# Patient Record
Sex: FEMALE | Race: WHITE | NOT HISPANIC OR LATINO | Employment: UNEMPLOYED | URBAN - METROPOLITAN AREA
[De-identification: names, ages, dates, MRNs, and addresses within clinical notes are randomized per-mention and may not be internally consistent; named-entity substitution may affect disease eponyms.]

---

## 2017-01-19 RX ORDER — CLONAZEPAM 1 MG/1
1 TABLET ORAL 2 TIMES DAILY
Qty: 60 TABLET | Refills: 0 | Status: SHIPPED | OUTPATIENT
Start: 2017-01-19 | End: 2017-02-18 | Stop reason: SDUPTHER

## 2017-01-23 ENCOUNTER — OFFICE VISIT (OUTPATIENT)
Dept: FAMILY MEDICINE | Facility: CLINIC | Age: 42
End: 2017-01-23
Payer: COMMERCIAL

## 2017-01-23 VITALS
DIASTOLIC BLOOD PRESSURE: 74 MMHG | HEART RATE: 76 BPM | HEIGHT: 58 IN | SYSTOLIC BLOOD PRESSURE: 126 MMHG | BODY MASS INDEX: 35.26 KG/M2 | WEIGHT: 168 LBS

## 2017-01-23 DIAGNOSIS — G47.00 INSOMNIA, UNSPECIFIED TYPE: ICD-10-CM

## 2017-01-23 DIAGNOSIS — F41.9 ANXIETY: ICD-10-CM

## 2017-01-23 DIAGNOSIS — E03.9 HYPOTHYROIDISM, UNSPECIFIED TYPE: Primary | ICD-10-CM

## 2017-01-23 DIAGNOSIS — F32.A DEPRESSION, UNSPECIFIED DEPRESSION TYPE: ICD-10-CM

## 2017-01-23 PROCEDURE — 1159F MED LIST DOCD IN RCRD: CPT | Mod: S$GLB,,, | Performed by: FAMILY MEDICINE

## 2017-01-23 PROCEDURE — 99999 PR PBB SHADOW E&M-EST. PATIENT-LVL III: CPT | Mod: PBBFAC,,, | Performed by: FAMILY MEDICINE

## 2017-01-23 PROCEDURE — 99214 OFFICE O/P EST MOD 30 MIN: CPT | Mod: S$GLB,,, | Performed by: FAMILY MEDICINE

## 2017-01-23 RX ORDER — CITALOPRAM 40 MG/1
40 TABLET, FILM COATED ORAL NIGHTLY
Qty: 90 TABLET | Refills: 0 | Status: SHIPPED | OUTPATIENT
Start: 2017-01-23 | End: 2017-04-24 | Stop reason: SDUPTHER

## 2017-01-23 RX ORDER — PHENTERMINE HYDROCHLORIDE 37.5 MG/1
TABLET ORAL
Qty: 30 TABLET | Refills: 0 | Status: SHIPPED | OUTPATIENT
Start: 2017-01-23 | End: 2017-04-03 | Stop reason: SDUPTHER

## 2017-01-23 NOTE — MR AVS SNAPSHOT
[facility]  [address]  [facility] [address]  Phone: [phone]  Fax: [phone]                  Jory Wheeler   2017 2:40 PM   Office Visit    Description:  Female : 1975   Provider:  Arpan Eng MD   Department:  [facility]           Reason for Visit     Hypothyroidism           Diagnoses this Visit        Comments    Hypothyroidism, unspecified type    -  Primary     Anxiety         Depression, unspecified depression type         Congenital hypothyroidism without goiter                To Do List           Future Appointments        Provider Department Dept Phone    2017 2:40 PM Arpan Eng MD [facility] [phone]      Goals (5 Years of Data)     None      Follow-Up and Disposition     Return in about 3 months (around 2017).       These Medications        Disp Refills Start End    citalopram (CELEXA) 40 MG tablet 90 tablet 0 2017     Take 1 tablet (40 mg total) by mouth every evening. - Oral    Pharmacy: [facility] [address] [place] [address] AT [place] [address] & [facility] Ph #: [phone]       phentermine (ADIPEX-P) 37.5 mg tablet 30 tablet 0 2017     TK 1 T PO  QAM    Pharmacy: [facility] [address] [place] [address] AT [place] [address] & [facility] Ph #: [phone]         [facility] On Call     [facility] On Call Nurse Care Line -  Assistance  Registered nurses in the [facility] provide clinical advisement, health education, appointment booking, and other advisory services.  Call for this free service at [phone].             Medications           Message regarding Medications     Verify the changes and/or additions to your medication regime listed below are the same as discussed with your clinician today.  If any of these changes or additions are incorrect, please notify your healthcare provider.             Verify  "that the below list of medications is an accurate representation of the medications you are currently taking.  If none reported, the list may be blank. If incorrect, please contact your healthcare provider. Carry this list with you in case of emergency.           Current Medications     acyclovir (ZOVIRAX) 400 MG tablet Take 400 mg by mouth as needed.    citalopram (CELEXA) 40 MG tablet Take 1 tablet (40 mg total) by mouth every evening.    clonazePAM (KLONOPIN) 1 MG tablet Take 1 tablet (1 mg total) by mouth 2 (two) times daily.    clotrimazole-betamethasone 1-0.05% (LOTRISONE) cream     ibuprofen (ADVIL,MOTRIN) 600 MG tablet Take 1 tablet (600 mg total) by mouth every 6 (six) hours as needed for Pain.    levothyroxine (SYNTHROID) 50 MCG tablet Take 50 mcg by mouth once daily.    phentermine (ADIPEX-P) 37.5 mg tablet TK 1 T PO  QAM    zolpidem (AMBIEN) 10 mg Tab TAKE 1 TABLET BY MOUTH NIGHTLY AS NEEDED           Clinical Reference Information           Vital Signs - Last Recorded  Most recent update: 1/23/2017  2:58 PM by Ashley Ronquillo LPN    BP Pulse Ht Wt LMP BMI    126/74 76 4' 10" (1.473 m) 76.2 kg (167 lb 15.9 oz) 06/27/2016 (Exact Date) 35.11 kg/m2      Blood Pressure          Most Recent Value    BP  126/74      Allergies as of 1/23/2017     Clindamycin    Levofloxacin    Gluten Protein      Immunizations Administered on Date of Encounter - 1/23/2017     None      Orders Placed During Today's Visit     Future Labs/Procedures Expected by Expires    Comprehensive metabolic panel  7/22/2017 3/24/2018    Lipid panel  7/22/2017 1/23/2018    TSH  7/22/2017 1/23/2018      "

## 2017-01-29 NOTE — PROGRESS NOTES
HISTORY OF PRESENT ILLNESS:  A pleasant female well known to me.  She is    with children.  She has hypothyroidism, anxiety, depression and obesity.  She   is doing well from every standpoint, trying to diet and exercise some.  She does   have a long-term history of insomnia.  Nonsmoker.    PAST MEDICAL, SURGICAL AND SOCIAL HISTORY:  Reviewed.    MEDICATIONS:  Reviewed.    PHYSICAL EXAMINATION:  Vital signs normal.  BMI 35.  No edema of the   extremities.  Chest clear.  Abdomen soft and nontender.  Thyroid normal.    Regular rhythm, no murmur or gallop.  No scleral icterus, jaundice or   hepatosplenomegaly.    ASSESSMENT:  Hypothyroidism, anxiety, depression and insomnia.    PLAN:  We discussed caffeine, exercise, weight loss.  She is using Adipex-P for   weight loss.  We ordered TSH, lipids and CMP.  We discussed the controlled   nature of Ambien, Adipex-P and Klonopin.  She has signed a contract with us   earlier this year.  We will see her back in followup.      NOMAN  dd: 01/29/2017 15:59:11 (CST)  td: 01/29/2017 19:04:28 (CST)  Doc ID   #[id number]  Job ID #[id number]    CC:

## 2017-02-20 RX ORDER — CLONAZEPAM 1 MG/1
TABLET ORAL
Qty: 60 TABLET | Refills: 0 | Status: SHIPPED | OUTPATIENT
Start: 2017-02-20 | End: 2017-03-22 | Stop reason: SDUPTHER

## 2017-03-06 RX ORDER — ZOLPIDEM TARTRATE 10 MG/1
TABLET ORAL
Qty: 30 TABLET | Refills: 3 | Status: SHIPPED | OUTPATIENT
Start: 2017-03-06 | End: 2017-06-28 | Stop reason: SDUPTHER

## 2017-03-22 RX ORDER — CLONAZEPAM 1 MG/1
1 TABLET ORAL 2 TIMES DAILY
Qty: 60 TABLET | Refills: 0 | Status: SHIPPED | OUTPATIENT
Start: 2017-03-22 | End: 2017-04-20 | Stop reason: SDUPTHER

## 2017-04-03 RX ORDER — PHENTERMINE HYDROCHLORIDE 37.5 MG/1
TABLET ORAL
Qty: 30 TABLET | Refills: 0 | Status: SHIPPED | OUTPATIENT
Start: 2017-04-03 | End: 2017-05-11 | Stop reason: SDUPTHER

## 2017-04-21 RX ORDER — CLONAZEPAM 1 MG/1
TABLET ORAL
Qty: 60 TABLET | Refills: 0 | Status: SHIPPED | OUTPATIENT
Start: 2017-04-21 | End: 2017-04-25 | Stop reason: SDUPTHER

## 2017-04-25 ENCOUNTER — PATIENT MESSAGE (OUTPATIENT)
Dept: FAMILY MEDICINE | Facility: CLINIC | Age: 42
End: 2017-04-25

## 2017-04-25 NOTE — TELEPHONE ENCOUNTER
----- Message from Dalia Vikram sent at 4/25/2017 12:34 PM CDT -----  Contact: patient  Patient calling in regards to requesting a refill for Klonopin. She would like the Nurse to call when it is sent in. Please advise.  Call back .  Thanks!  [facility] [address] - [place] [address] & [address] [address]  Phone: [phone] Fax: [phone]

## 2017-04-25 NOTE — TELEPHONE ENCOUNTER
----- Message from Racheal Thomas sent at 4/25/2017  8:46 AM CDT -----  Pharmacy is calling for clonazePAM (KLONOPIN) 1 MG tablet refill    [facility] [address] [place] [address] [address] AT [facility] [address] & [address] [place] [id number]  Phone: [phone] Fax: [phone]

## 2017-04-25 NOTE — TELEPHONE ENCOUNTER
I am sorry. Klonopin was approved by Dr. Eng on 4/21. But the pharmacy said that they are still waiting for him to send in a prescription for it.   Thanks

## 2017-04-27 RX ORDER — CLONAZEPAM 1 MG/1
1 TABLET ORAL 2 TIMES DAILY
Qty: 60 TABLET | Refills: 0 | Status: SHIPPED | OUTPATIENT
Start: 2017-04-27 | End: 2017-05-26 | Stop reason: SDUPTHER

## 2017-04-27 RX ORDER — CITALOPRAM 40 MG/1
TABLET, FILM COATED ORAL
Qty: 90 TABLET | Refills: 0 | Status: SHIPPED | OUTPATIENT
Start: 2017-04-27 | End: 2017-08-03 | Stop reason: SDUPTHER

## 2017-05-15 RX ORDER — PHENTERMINE HYDROCHLORIDE 37.5 MG/1
TABLET ORAL
Qty: 30 TABLET | Refills: 0 | Status: SHIPPED | OUTPATIENT
Start: 2017-05-15 | End: 2017-06-13 | Stop reason: SDUPTHER

## 2017-05-26 ENCOUNTER — TELEPHONE (OUTPATIENT)
Dept: FAMILY MEDICINE | Facility: CLINIC | Age: 42
End: 2017-05-26

## 2017-05-26 ENCOUNTER — OFFICE VISIT (OUTPATIENT)
Dept: FAMILY MEDICINE | Facility: CLINIC | Age: 42
End: 2017-05-26
Payer: COMMERCIAL

## 2017-05-26 VITALS
HEART RATE: 103 BPM | SYSTOLIC BLOOD PRESSURE: 118 MMHG | HEIGHT: 58 IN | DIASTOLIC BLOOD PRESSURE: 84 MMHG | WEIGHT: 158.5 LBS | BODY MASS INDEX: 33.27 KG/M2

## 2017-05-26 DIAGNOSIS — F32.A DEPRESSION, UNSPECIFIED DEPRESSION TYPE: ICD-10-CM

## 2017-05-26 DIAGNOSIS — F41.9 ANXIETY: ICD-10-CM

## 2017-05-26 DIAGNOSIS — E03.9 HYPOTHYROIDISM, UNSPECIFIED TYPE: Primary | ICD-10-CM

## 2017-05-26 DIAGNOSIS — G47.00 INSOMNIA, UNSPECIFIED TYPE: ICD-10-CM

## 2017-05-26 PROCEDURE — 99999 PR PBB SHADOW E&M-EST. PATIENT-LVL III: CPT | Mod: PBBFAC,,, | Performed by: FAMILY MEDICINE

## 2017-05-26 PROCEDURE — 99214 OFFICE O/P EST MOD 30 MIN: CPT | Mod: S$GLB,,, | Performed by: FAMILY MEDICINE

## 2017-05-26 RX ORDER — CLONAZEPAM 1 MG/1
1 TABLET ORAL 2 TIMES DAILY
Qty: 60 TABLET | Refills: 0 | Status: SHIPPED | OUTPATIENT
Start: 2017-05-26 | End: 2017-06-25 | Stop reason: SDUPTHER

## 2017-05-26 RX ORDER — TRAZODONE HYDROCHLORIDE 50 MG/1
50 TABLET ORAL NIGHTLY
Qty: 30 TABLET | Refills: 11 | Status: SHIPPED | OUTPATIENT
Start: 2017-05-26 | End: 2018-04-30 | Stop reason: SDUPTHER

## 2017-05-26 RX ORDER — ZOLPIDEM TARTRATE 10 MG/1
TABLET ORAL
Qty: 30 TABLET | Refills: 3 | Status: CANCELLED | OUTPATIENT
Start: 2017-05-26

## 2017-05-26 NOTE — TELEPHONE ENCOUNTER
----- Message from Sudha Tobin sent at 5/26/2017 11:09 AM CDT -----  Contact: Rach at [facility]  Rach at [email or web address] [phone] is needing diagnosis codes for blood lab work/patient is at [facility] now/please call

## 2017-06-07 ENCOUNTER — PATIENT MESSAGE (OUTPATIENT)
Dept: FAMILY MEDICINE | Facility: CLINIC | Age: 42
End: 2017-06-07

## 2017-06-11 NOTE — PROGRESS NOTES
HISTORY OF PRESENT ILLNESS:  A pleasant female well known to me.  She has got a   history of hypothyroidism, anxiety, depression and insomnia.  She is a   nonsmoker, mother, and businesswoman.  She is tolerating her medications well.    [facility].  She has signed a controlled medication contract   with us.  Health maintenance was reviewed.  Labs were reviewed.  Medications   reviewed.  She also uses Adipex-P for overeating.    PHYSICAL EXAMINATION:  A pleasant female in no apparent distress.  Chest is   clear.  BMI 33.  Regular rhythm.  No murmur or gallop.  No carotid bruits.  No   peripheral edema or skin rash.  Affect was pleasant and polite.    ASSESSMENT:  Hypothyroidism, anxiety, depression, and insomnia.    PLAN:  We addressed caffeine and exercise.  We reviewed medications, reviewed   controlled medication contract, reviewed labs.  She will follow up with her PCP.      TEVIN/KARIN  dd: 06/11/2017 09:50:28 (CDT)  td: 06/11/2017 20:39:15 (CDT)  Doc ID   #[id number]  Job ID #[id number]    CC:

## 2017-06-13 RX ORDER — PHENTERMINE HYDROCHLORIDE 37.5 MG/1
37.5 TABLET ORAL EVERY MORNING
Qty: 30 TABLET | Refills: 0 | Status: SHIPPED | OUTPATIENT
Start: 2017-06-13 | End: 2017-07-11 | Stop reason: SDUPTHER

## 2017-06-15 ENCOUNTER — PATIENT MESSAGE (OUTPATIENT)
Dept: FAMILY MEDICINE | Facility: CLINIC | Age: 42
End: 2017-06-15

## 2017-06-27 RX ORDER — CLONAZEPAM 1 MG/1
TABLET ORAL
Qty: 60 TABLET | Refills: 4 | Status: SHIPPED | OUTPATIENT
Start: 2017-06-27 | End: 2017-11-17 | Stop reason: SDUPTHER

## 2017-06-28 RX ORDER — ZOLPIDEM TARTRATE 10 MG/1
TABLET ORAL
Qty: 30 TABLET | Refills: 3 | Status: SHIPPED | OUTPATIENT
Start: 2017-06-28 | End: 2017-10-16 | Stop reason: SDUPTHER

## 2017-07-11 RX ORDER — PHENTERMINE HYDROCHLORIDE 37.5 MG/1
37.5 TABLET ORAL EVERY MORNING
Qty: 30 TABLET | Refills: 0 | OUTPATIENT
Start: 2017-07-11

## 2017-07-11 RX ORDER — PHENTERMINE HYDROCHLORIDE 37.5 MG/1
TABLET ORAL
Qty: 30 TABLET | Refills: 0 | Status: SHIPPED | OUTPATIENT
Start: 2017-07-11 | End: 2017-08-22 | Stop reason: SDUPTHER

## 2017-08-03 NOTE — TELEPHONE ENCOUNTER
Dr Rowley, patient has an establish care appointment with you on 09/25, can we please fill this Rx until appointment date. Thank you.

## 2017-08-04 ENCOUNTER — PATIENT MESSAGE (OUTPATIENT)
Dept: FAMILY MEDICINE | Facility: CLINIC | Age: 42
End: 2017-08-04

## 2017-08-04 RX ORDER — CITALOPRAM 40 MG/1
40 TABLET, FILM COATED ORAL DAILY
Qty: 90 TABLET | Refills: 0 | Status: SHIPPED | OUTPATIENT
Start: 2017-08-04 | End: 2017-09-25 | Stop reason: SDUPTHER

## 2017-08-04 RX ORDER — CITALOPRAM 40 MG/1
TABLET, FILM COATED ORAL
Qty: 90 TABLET | Refills: 0 | OUTPATIENT
Start: 2017-08-04

## 2017-08-04 RX ORDER — CITALOPRAM 40 MG/1
TABLET, FILM COATED ORAL
Qty: 90 TABLET | Refills: 0 | Status: SHIPPED | OUTPATIENT
Start: 2017-08-04 | End: 2017-09-25 | Stop reason: SDUPTHER

## 2017-08-07 NOTE — TELEPHONE ENCOUNTER
----- Message from Sudha Ch sent at 8/4/2017 10:40 AM CDT -----  Contact: Jory  Patient has requested three times previously via [facility] for refill Rx Celexa (Dr Eng patient). States will be out of medication over weekend.     [facility] [address] - [address] AT [facility] 190 & [place] [id number] [address]  Phone: [phone] Fax: [phone]    Please call [phone]. Thanks!

## 2017-08-08 RX ORDER — CITALOPRAM 40 MG/1
TABLET, FILM COATED ORAL
Qty: 90 TABLET | Refills: 0 | OUTPATIENT
Start: 2017-08-08

## 2017-08-09 RX ORDER — CITALOPRAM 40 MG/1
40 TABLET, FILM COATED ORAL DAILY
Qty: 90 TABLET | Refills: 0 | Status: SHIPPED | OUTPATIENT
Start: 2017-08-09 | End: 2018-01-05 | Stop reason: SDUPTHER

## 2017-08-22 RX ORDER — PHENTERMINE HYDROCHLORIDE 37.5 MG/1
TABLET ORAL
Qty: 30 TABLET | Refills: 0 | Status: SHIPPED | OUTPATIENT
Start: 2017-08-22 | End: 2017-09-25

## 2017-09-22 ENCOUNTER — PATIENT OUTREACH (OUTPATIENT)
Dept: ADMINISTRATIVE | Facility: HOSPITAL | Age: 42
End: 2017-09-22

## 2017-09-25 ENCOUNTER — OFFICE VISIT (OUTPATIENT)
Dept: FAMILY MEDICINE | Facility: CLINIC | Age: 42
End: 2017-09-25
Payer: COMMERCIAL

## 2017-09-25 VITALS
HEIGHT: 58 IN | HEART RATE: 76 BPM | BODY MASS INDEX: 31.05 KG/M2 | WEIGHT: 147.94 LBS | DIASTOLIC BLOOD PRESSURE: 82 MMHG | SYSTOLIC BLOOD PRESSURE: 108 MMHG

## 2017-09-25 DIAGNOSIS — G47.26 SHIFT WORK SLEEP DISORDER: ICD-10-CM

## 2017-09-25 DIAGNOSIS — L50.9 URTICARIA: ICD-10-CM

## 2017-09-25 DIAGNOSIS — F41.9 ANXIETY: ICD-10-CM

## 2017-09-25 DIAGNOSIS — Z12.39 SCREENING FOR BREAST CANCER: ICD-10-CM

## 2017-09-25 DIAGNOSIS — G25.81 RESTLESS LEG: ICD-10-CM

## 2017-09-25 DIAGNOSIS — F32.A DEPRESSION, UNSPECIFIED DEPRESSION TYPE: ICD-10-CM

## 2017-09-25 DIAGNOSIS — Z00.00 WELLNESS EXAMINATION: Primary | ICD-10-CM

## 2017-09-25 DIAGNOSIS — G47.00 INSOMNIA, UNSPECIFIED TYPE: ICD-10-CM

## 2017-09-25 PROCEDURE — 99999 PR PBB SHADOW E&M-EST. PATIENT-LVL III: CPT | Mod: PBBFAC,,, | Performed by: FAMILY MEDICINE

## 2017-09-25 PROCEDURE — 99396 PREV VISIT EST AGE 40-64: CPT | Mod: S$GLB,,, | Performed by: FAMILY MEDICINE

## 2017-09-25 RX ORDER — MODAFINIL 100 MG/1
200 TABLET ORAL DAILY PRN
Qty: 30 TABLET | Refills: 0 | Status: SHIPPED | OUTPATIENT
Start: 2017-09-25 | End: 2017-10-25

## 2017-09-25 NOTE — PROGRESS NOTES
Subjective:       Patient ID: Jory Wheeler is a 42 y.o. female.    Chief Complaint: Establish Care (Patient here to establish care )    Here as new patient to me; previously saw Dr. Eng.  Due for wellness and labs.  Took adipex for energy due to shift work.      Review of Systems   Constitutional: Negative for chills, fatigue and fever.   Respiratory: Negative for cough, chest tightness and shortness of breath.    Cardiovascular: Negative for chest pain, palpitations and leg swelling.   Endocrine: Negative for cold intolerance and heat intolerance.   Skin: Negative for rash.       Objective:      Physical Exam   Constitutional: She appears well-developed and well-nourished.   HENT:   Head: Normocephalic and atraumatic.   Cardiovascular: Normal rate, regular rhythm and normal heart sounds.    Pulmonary/Chest: Effort normal and breath sounds normal.   Musculoskeletal: Normal range of motion.   Psychiatric: She has a normal mood and affect.   Nursing note and vitals reviewed.      Assessment:       1. Wellness examination    2. Insomnia, unspecified type    3. Restless leg    4. Depression, unspecified depression type    5. Anxiety    6. Urticaria    7. Shift work sleep disorder    8. Screening for breast cancer        Plan:       Wellness examination  -     Cancel: CBC auto differential; Future; Expected date: 09/25/2017  -     Cancel: Comprehensive metabolic panel; Future; Expected date: 09/25/2017  -     Cancel: Lipid panel; Future; Expected date: 09/25/2017  -     Cancel: TSH; Future; Expected date: 09/25/2017  -     Cancel: Hemoglobin A1c; Future; Expected date: 09/25/2017  -     CBC auto differential; Future; Expected date: 09/25/2017  -     Comprehensive metabolic panel; Future; Expected date: 09/25/2017  -     Lipid panel; Future; Expected date: 09/25/2017  -     Hemoglobin A1c; Future; Expected date: 09/25/2017  -     TSH; Future; Expected date: 09/25/2017    Insomnia, unspecified type    Restless  leg    Depression, unspecified depression type    Anxiety    Urticaria  -     Ambulatory referral to Allergy    Shift work sleep disorder  -     modafinil (PROVIGIL) 100 MG Tab; Take 2 tablets (200 mg total) by mouth daily as needed.  Dispense: 30 tablet; Refill: 0    Screening for breast cancer  -     Mammo Digital Screening Bilat with CAD; Future; Expected date: 09/25/2017      Update labs and health maintenance.  provigil for shift work disorder instead of adipex    Return in about 4 months (around 1/25/2018), or if symptoms worsen or fail to improve.

## 2017-09-27 ENCOUNTER — PATIENT MESSAGE (OUTPATIENT)
Dept: FAMILY MEDICINE | Facility: CLINIC | Age: 42
End: 2017-09-27

## 2017-09-28 DIAGNOSIS — G47.26 SHIFT WORK SLEEP DISORDER: ICD-10-CM

## 2017-09-29 RX ORDER — MODAFINIL 100 MG/1
200 TABLET ORAL DAILY PRN
Qty: 30 TABLET | Refills: 0 | Status: CANCELLED | OUTPATIENT
Start: 2017-09-29 | End: 2017-10-29

## 2017-09-30 ENCOUNTER — PATIENT MESSAGE (OUTPATIENT)
Dept: FAMILY MEDICINE | Facility: CLINIC | Age: 42
End: 2017-09-30

## 2017-10-03 ENCOUNTER — PATIENT MESSAGE (OUTPATIENT)
Dept: FAMILY MEDICINE | Facility: CLINIC | Age: 42
End: 2017-10-03

## 2017-10-03 DIAGNOSIS — G47.26 SHIFT WORK SLEEP DISORDER: ICD-10-CM

## 2017-10-06 ENCOUNTER — PATIENT MESSAGE (OUTPATIENT)
Dept: FAMILY MEDICINE | Facility: CLINIC | Age: 42
End: 2017-10-06

## 2017-10-06 RX ORDER — MODAFINIL 100 MG/1
200 TABLET ORAL DAILY PRN
Qty: 30 TABLET | Refills: 0 | OUTPATIENT
Start: 2017-10-06 | End: 2017-11-05

## 2017-10-07 ENCOUNTER — PATIENT MESSAGE (OUTPATIENT)
Dept: FAMILY MEDICINE | Facility: CLINIC | Age: 42
End: 2017-10-07

## 2017-10-11 ENCOUNTER — PATIENT MESSAGE (OUTPATIENT)
Dept: FAMILY MEDICINE | Facility: CLINIC | Age: 42
End: 2017-10-11

## 2017-10-13 NOTE — TELEPHONE ENCOUNTER
Patient is requesting a new Rx for Nuvigil. States she would like to get a Rx savings card as provigil with insurance is $150.00.   has a coupon with a Rx for brand name med dispense as written, Please advise.

## 2017-10-13 NOTE — TELEPHONE ENCOUNTER
----- Message from Vicky Moncada sent at 10/9/2017 12:33 PM CDT -----  Patient requesting to speak with nurse concerning previous messages sent concerning medication/has not heard from anyone/please call back at [phone].

## 2017-10-16 RX ORDER — ARMODAFINIL 50 MG/1
100 TABLET ORAL DAILY
Qty: 60 TABLET | Refills: 0 | Status: SHIPPED | OUTPATIENT
Start: 2017-10-16 | End: 2017-10-30

## 2017-10-19 RX ORDER — ZOLPIDEM TARTRATE 10 MG/1
TABLET ORAL
Qty: 30 TABLET | Refills: 0 | Status: SHIPPED | OUTPATIENT
Start: 2017-10-19 | End: 2017-11-17 | Stop reason: SDUPTHER

## 2017-10-20 ENCOUNTER — PATIENT MESSAGE (OUTPATIENT)
Dept: FAMILY MEDICINE | Facility: CLINIC | Age: 42
End: 2017-10-20

## 2017-10-20 NOTE — TELEPHONE ENCOUNTER
Patient has prescription card for Nuvigil (brand name only). Could we rewrite the order to allow patient to fill prescription? Please advise.

## 2017-10-27 NOTE — TELEPHONE ENCOUNTER
Attempted to contact patient to clarify dosage.   My chart message also sent to ask for clarification,.

## 2017-10-30 ENCOUNTER — TELEPHONE (OUTPATIENT)
Dept: FAMILY MEDICINE | Facility: CLINIC | Age: 42
End: 2017-10-30

## 2017-10-30 RX ORDER — ARMODAFINIL 200 MG/1
200 TABLET ORAL DAILY PRN
Qty: 30 TABLET | Refills: 0 | Status: SHIPPED | OUTPATIENT
Start: 2017-10-30 | End: 2017-12-07 | Stop reason: SDUPTHER

## 2017-10-30 RX ORDER — ARMODAFINIL 50 MG/1
100 TABLET ORAL DAILY
Qty: 60 TABLET | Refills: 0 | Status: CANCELLED | OUTPATIENT
Start: 2017-10-30 | End: 2017-11-29

## 2017-10-30 NOTE — TELEPHONE ENCOUNTER
----- Message from Ramonita Moreno sent at 10/27/2017 11:13 AM CDT -----  Contact: self  Returning a call. Please call back [phone]

## 2017-10-31 NOTE — TELEPHONE ENCOUNTER
Refill Authorization Note     is requesting a refill authorization.    Brief assessment and rational for refill: APPROVE: prr  Name of medication: CITALOPRAM 40MG TABLETS  How patient will take medication: t1t po daily   Amount/Quantity of medication ordered: 90d   Medication reconciliation completed: No        Refills Authorized: Yes  If authorized number of refills: 1        Medication Therapy Plan: LOV 9/17.  Not sure if depression is in remission.  Will que for 6m if applicable  Comments:

## 2017-11-01 RX ORDER — CITALOPRAM 40 MG/1
TABLET, FILM COATED ORAL
Qty: 90 TABLET | Refills: 1 | Status: SHIPPED | OUTPATIENT
Start: 2017-11-01 | End: 2018-07-26 | Stop reason: SDUPTHER

## 2017-11-17 RX ORDER — ZOLPIDEM TARTRATE 10 MG/1
TABLET ORAL
Qty: 30 TABLET | Refills: 0 | Status: SHIPPED | OUTPATIENT
Start: 2017-11-17 | End: 2017-12-14 | Stop reason: SDUPTHER

## 2017-11-20 RX ORDER — CLONAZEPAM 1 MG/1
TABLET ORAL
Qty: 60 TABLET | Refills: 5 | Status: SHIPPED | OUTPATIENT
Start: 2017-11-20 | End: 2018-05-10 | Stop reason: SDUPTHER

## 2017-12-08 RX ORDER — ARMODAFINIL 200 MG/1
TABLET ORAL
Qty: 30 TABLET | Refills: 0 | Status: SHIPPED | OUTPATIENT
Start: 2017-12-08 | End: 2018-02-05

## 2017-12-17 RX ORDER — ZOLPIDEM TARTRATE 10 MG/1
TABLET ORAL
Qty: 30 TABLET | Refills: 0 | Status: SHIPPED | OUTPATIENT
Start: 2017-12-17 | End: 2018-01-14 | Stop reason: SDUPTHER

## 2018-01-04 ENCOUNTER — PATIENT MESSAGE (OUTPATIENT)
Dept: FAMILY MEDICINE | Facility: CLINIC | Age: 43
End: 2018-01-04

## 2018-01-05 RX ORDER — CLOTRIMAZOLE AND BETAMETHASONE DIPROPIONATE 10; .64 MG/G; MG/G
CREAM TOPICAL 2 TIMES DAILY
Qty: 45 G | Refills: 0 | Status: SHIPPED | OUTPATIENT
Start: 2018-01-05 | End: 2018-03-29 | Stop reason: SDUPTHER

## 2018-01-05 NOTE — TELEPHONE ENCOUNTER
Refill Authorization Note     is requesting a refill authorization.    Brief assessment and rationale for refill: Defer; unclear indication                       Medication-related problems identified: Therapeutic duplication  Medication Therapy Plan: Pt w/ hx of urticaria but not clearly documented if beneficial or for rashes.  WIll defer   Name and strength of medication: clotrimazole-betamethasone 1-0.05% (LOTRISONE) cream        Comments:

## 2018-01-07 ENCOUNTER — PATIENT MESSAGE (OUTPATIENT)
Dept: FAMILY MEDICINE | Facility: CLINIC | Age: 43
End: 2018-01-07

## 2018-01-08 NOTE — TELEPHONE ENCOUNTER
Patient is requesting a refill for adipex, which is not listed as a current medication on her list.   I have pended it as requested.   Please advise.     Please see my chart message.

## 2018-01-10 RX ORDER — PHENTERMINE HYDROCHLORIDE 37.5 MG/1
37.5 TABLET ORAL
Qty: 30 TABLET | Refills: 0 | OUTPATIENT
Start: 2018-01-10 | End: 2018-02-09

## 2018-01-15 RX ORDER — ZOLPIDEM TARTRATE 10 MG/1
TABLET ORAL
Qty: 30 TABLET | Refills: 0 | Status: SHIPPED | OUTPATIENT
Start: 2018-01-15 | End: 2018-02-05 | Stop reason: SDUPTHER

## 2018-01-26 LAB
ALBUMIN SERPL-MCNC: 4 G/DL (ref 3.6–5.1)
ALBUMIN/GLOB SERPL: 2 (CALC) (ref 1–2.5)
ALP SERPL-CCNC: 37 U/L (ref 33–115)
ALT SERPL-CCNC: 10 U/L (ref 6–29)
AST SERPL-CCNC: 13 U/L (ref 10–30)
BASOPHILS # BLD AUTO: 22 CELLS/UL (ref 0–200)
BASOPHILS NFR BLD AUTO: 0.5 %
BILIRUB SERPL-MCNC: 0.5 MG/DL (ref 0.2–1.2)
BUN SERPL-MCNC: 18 MG/DL (ref 7–25)
BUN/CREAT SERPL: ABNORMAL (CALC) (ref 6–22)
CALCIUM SERPL-MCNC: 8.9 MG/DL (ref 8.6–10.2)
CHLORIDE SERPL-SCNC: 107 MMOL/L (ref 98–110)
CHOLEST SERPL-MCNC: 156 MG/DL
CHOLEST/HDLC SERPL: 3 (CALC)
CO2 SERPL-SCNC: 26 MMOL/L (ref 20–31)
CREAT SERPL-MCNC: 0.63 MG/DL (ref 0.5–1.1)
EOSINOPHIL # BLD AUTO: 62 CELLS/UL (ref 15–500)
EOSINOPHIL NFR BLD AUTO: 1.4 %
ERYTHROCYTE [DISTWIDTH] IN BLOOD BY AUTOMATED COUNT: 11.8 % (ref 11–15)
GFR SERPL CREATININE-BSD FRML MDRD: 112 ML/MIN/1.73M2
GLOBULIN SER CALC-MCNC: 2 G/DL (CALC) (ref 1.9–3.7)
GLUCOSE SERPL-MCNC: 95 MG/DL (ref 65–99)
HBA1C MFR BLD: 5.2 % OF TOTAL HGB
HCT VFR BLD AUTO: 33.6 % (ref 35–45)
HDLC SERPL-MCNC: 52 MG/DL
HGB BLD-MCNC: 11.2 G/DL (ref 11.7–15.5)
LDLC SERPL CALC-MCNC: 88 MG/DL (CALC)
LYMPHOCYTES # BLD AUTO: 2235 CELLS/UL (ref 850–3900)
LYMPHOCYTES NFR BLD AUTO: 50.8 %
MCH RBC QN AUTO: 30.6 PG (ref 27–33)
MCHC RBC AUTO-ENTMCNC: 33.3 G/DL (ref 32–36)
MCV RBC AUTO: 91.8 FL (ref 80–100)
MONOCYTES # BLD AUTO: 299 CELLS/UL (ref 200–950)
MONOCYTES NFR BLD AUTO: 6.8 %
NEUTROPHILS # BLD AUTO: 1782 CELLS/UL (ref 1500–7800)
NEUTROPHILS NFR BLD AUTO: 40.5 %
NONHDLC SERPL-MCNC: 104 MG/DL (CALC)
PLATELET # BLD AUTO: 274 THOUSAND/UL (ref 140–400)
PMV BLD REES-ECKER: 9.7 FL (ref 7.5–12.5)
POTASSIUM SERPL-SCNC: 4.3 MMOL/L (ref 3.5–5.3)
PROT SERPL-MCNC: 6 G/DL (ref 6.1–8.1)
RBC # BLD AUTO: 3.66 MILLION/UL (ref 3.8–5.1)
SODIUM SERPL-SCNC: 138 MMOL/L (ref 135–146)
TRIGL SERPL-MCNC: 69 MG/DL
TSH SERPL-ACNC: 0.96 MIU/L
WBC # BLD AUTO: 4.4 THOUSAND/UL (ref 3.8–10.8)

## 2018-02-05 ENCOUNTER — OFFICE VISIT (OUTPATIENT)
Dept: FAMILY MEDICINE | Facility: CLINIC | Age: 43
End: 2018-02-05
Payer: COMMERCIAL

## 2018-02-05 VITALS
SYSTOLIC BLOOD PRESSURE: 104 MMHG | HEART RATE: 81 BPM | WEIGHT: 147.06 LBS | OXYGEN SATURATION: 97 % | TEMPERATURE: 98 F | HEIGHT: 58 IN | BODY MASS INDEX: 30.87 KG/M2 | DIASTOLIC BLOOD PRESSURE: 62 MMHG

## 2018-02-05 DIAGNOSIS — G47.00 INSOMNIA, UNSPECIFIED TYPE: ICD-10-CM

## 2018-02-05 DIAGNOSIS — G47.26 SHIFT WORK SLEEP DISORDER: Primary | ICD-10-CM

## 2018-02-05 PROCEDURE — 3008F BODY MASS INDEX DOCD: CPT | Mod: S$GLB,,, | Performed by: FAMILY MEDICINE

## 2018-02-05 PROCEDURE — 99999 PR PBB SHADOW E&M-EST. PATIENT-LVL III: CPT | Mod: PBBFAC,,, | Performed by: FAMILY MEDICINE

## 2018-02-05 PROCEDURE — 99214 OFFICE O/P EST MOD 30 MIN: CPT | Mod: S$GLB,,, | Performed by: FAMILY MEDICINE

## 2018-02-05 RX ORDER — ESTRADIOL 1 MG/1
TABLET ORAL
COMMUNITY
Start: 2018-01-02

## 2018-02-05 RX ORDER — ZOLPIDEM TARTRATE 10 MG/1
TABLET ORAL
Qty: 30 TABLET | Refills: 2 | Status: SHIPPED | OUTPATIENT
Start: 2018-02-05 | End: 2018-04-30 | Stop reason: SDUPTHER

## 2018-02-05 RX ORDER — MUPIROCIN 20 MG/G
OINTMENT TOPICAL 2 TIMES DAILY
Qty: 22 G | Refills: 0 | Status: SHIPPED | OUTPATIENT
Start: 2018-02-05 | End: 2019-10-17

## 2018-02-05 NOTE — PROGRESS NOTES
Subjective:       Patient ID: Jory Wheeler is a 42 y.o. female.    Chief Complaint: Follow-up (4 mo)    Here for f/u chronic medical issues. States doing well overall.  Had recent labs. Prn meds working well for shift work sleep issues.      Review of Systems   Constitutional: Negative for chills, fatigue and fever.   Respiratory: Negative for cough, chest tightness and shortness of breath.    Cardiovascular: Negative for chest pain, palpitations and leg swelling.   Gastrointestinal: Negative for abdominal distention and abdominal pain.   Endocrine: Negative for cold intolerance and heat intolerance.   Skin: Negative for rash.   Psychiatric/Behavioral: Negative for dysphoric mood. The patient is not nervous/anxious.        Objective:      Physical Exam   Constitutional: She appears well-developed and well-nourished.   HENT:   Head: Normocephalic and atraumatic.   Cardiovascular: Normal rate, regular rhythm and normal heart sounds.    Pulmonary/Chest: Effort normal and breath sounds normal.   Musculoskeletal: Normal range of motion.   Psychiatric: She has a normal mood and affect.   Nursing note and vitals reviewed.      Assessment:       1. Shift work sleep disorder    2. Insomnia, unspecified type        Plan:       Shift work sleep disorder    Insomnia, unspecified type    Other orders  -     zolpidem (AMBIEN) 10 mg Tab; TAKE 1 TABLET BY MOUTH EVERY NIGHT AT BEDTIME  Dispense: 30 tablet; Refill: 2  -     mupirocin (BACTROBAN) 2 % ointment; Apply topically 2 (two) times daily.  Dispense: 22 g; Refill: 0      Will monitor chronic medical issues and continue current plan of care.      Follow-up in about 4 months (around 6/5/2018), or if symptoms worsen or fail to improve.

## 2018-02-26 ENCOUNTER — PATIENT MESSAGE (OUTPATIENT)
Dept: FAMILY MEDICINE | Facility: CLINIC | Age: 43
End: 2018-02-26

## 2018-02-26 ENCOUNTER — OFFICE VISIT (OUTPATIENT)
Dept: FAMILY MEDICINE | Facility: CLINIC | Age: 43
End: 2018-02-26
Payer: COMMERCIAL

## 2018-02-26 ENCOUNTER — TELEPHONE (OUTPATIENT)
Dept: FAMILY MEDICINE | Facility: CLINIC | Age: 43
End: 2018-02-26

## 2018-02-26 VITALS
TEMPERATURE: 98 F | BODY MASS INDEX: 30.91 KG/M2 | OXYGEN SATURATION: 98 % | WEIGHT: 147.25 LBS | HEIGHT: 58 IN | HEART RATE: 75 BPM | DIASTOLIC BLOOD PRESSURE: 82 MMHG | SYSTOLIC BLOOD PRESSURE: 120 MMHG

## 2018-02-26 DIAGNOSIS — H66.012 ACUTE SUPPURATIVE OTITIS MEDIA OF LEFT EAR WITH SPONTANEOUS RUPTURE OF TYMPANIC MEMBRANE, RECURRENCE NOT SPECIFIED: Primary | ICD-10-CM

## 2018-02-26 PROCEDURE — 3008F BODY MASS INDEX DOCD: CPT | Mod: S$GLB,,, | Performed by: NURSE PRACTITIONER

## 2018-02-26 PROCEDURE — 99213 OFFICE O/P EST LOW 20 MIN: CPT | Mod: S$GLB,,, | Performed by: NURSE PRACTITIONER

## 2018-02-26 PROCEDURE — 99999 PR PBB SHADOW E&M-EST. PATIENT-LVL V: CPT | Mod: PBBFAC,,, | Performed by: NURSE PRACTITIONER

## 2018-02-26 RX ORDER — AMOXICILLIN AND CLAVULANATE POTASSIUM 875; 125 MG/1; MG/1
1 TABLET, FILM COATED ORAL EVERY 12 HOURS
Qty: 20 TABLET | Refills: 0 | Status: SHIPPED | OUTPATIENT
Start: 2018-02-26 | End: 2018-03-08

## 2018-02-26 RX ORDER — HYDROCODONE BITARTRATE AND ACETAMINOPHEN 5; 325 MG/1; MG/1
1 TABLET ORAL EVERY 6 HOURS PRN
Qty: 10 TABLET | Refills: 0 | Status: SHIPPED | OUTPATIENT
Start: 2018-02-26 | End: 2018-06-05

## 2018-02-26 NOTE — TELEPHONE ENCOUNTER
Patient seen today in clinic by Yamileth, requested that medication be pended to you for treatment of right ear infection.     Please advise.

## 2018-02-26 NOTE — PATIENT INSTRUCTIONS
Middle Ear Infection (Adult)  You have an infection of the middle ear, the space behind the eardrum. This is also called acute otitis media (AOM). Sometimes it is caused by the common cold. This is because congestion can block the internal passage (eustachian tube) that drains fluid from the middle ear. When the middle ear fills with fluid, bacteria can grow there and cause an infection. Oral antibiotics are used to treat this illness, not ear drops. Symptoms usually start to improve within 1 to 2 days of treatment.    Home care  The following are general care guidelines:  · Finish all of the antibiotic medicine given, even though you may feel better after the first few days.  · You may use over-the-counter medicine, such as acetaminophen or ibuprofen, to control pain and fever, unless something else was prescribed. If you have chronic liver or kidney disease or have ever had a stomach ulcer or gastrointestinal bleeding, talk with your healthcare provider before using these medicines. Do not give aspirin to anyone under 18 years of age who has a fever. It may cause severe illness or death.  Follow-up care  Follow up with your healthcare provider, or as advised, in 2 weeks if all symptoms have not gotten better, or if hearing doesn't go back to normal within 1 month.  When to seek medical advice  Call your healthcare provider right away if any of these occur:  · Ear pain gets worse or does not improve after 3 days of treatment  · Unusual drowsiness or confusion  · Neck pain, stiff neck, or headache  · Fluid or blood draining from the ear canal  · Fever of 100.4°F (38°C) or as advised   · Seizure  Date Last Reviewed: 6/1/2016  © 5614-9844 [employer]. [address] [place] [address], [place] [address]. All rights reserved. This information is not intended as a substitute for professional medical care. Always follow your healthcare professional's instructions.

## 2018-02-26 NOTE — PROGRESS NOTES
Subjective:       Patient ID: Jory Wheeler is a 42 y.o. female.  First time seeing pt in clinic.  Last seen by Dr Rowley on 2/5/2018.    Chief Complaint: Otitis Media (left, but feels like right ear is starting. about a wk. little drainage); Sore Throat; and loss of balance    Otitis Media:   Chronicity:  New  Onset:  In the past 7 days   Associated symptoms: chills, ear congestion, ear pain, postnasal drip and sore throat.  No fever, no congestion, no shortness of breath and no rhinorrhea.  Treatments tried: Sudafed and Zyrtec   Sore Throat     This is a new problem. The current episode started today. There has been no fever. Associated symptoms include ear discharge and ear pain. Pertinent negatives include no congestion, coughing or shortness of breath. She has tried nothing for the symptoms.    Otalgia     There is pain in the left (more than right) ear.  This is a new problem. The current episode started in the past 7 days. The problem occurs constantly. There has been no fever. The pain is at a severity of 2/10. Associated symptoms include ear discharge and a sore throat. Pertinent negatives include no coughing or rhinorrhea.     Vitals:    02/26/18 1559   BP: 120/82   Pulse: 75   Temp: 97.9 °F (36.6 °C)     Review of Systems   Constitutional: Positive for chills. Negative for diaphoresis and fever.   HENT: Positive for ear discharge, ear pain, postnasal drip and sore throat. Negative for congestion, facial swelling, rhinorrhea and voice change.    Eyes: Negative for visual disturbance.   Respiratory: Negative for cough, chest tightness and shortness of breath.    Cardiovascular: Negative for chest pain.       Objective:      Physical Exam   Constitutional: She is oriented to person, place, and time. Vital signs are normal. She appears well-developed and well-nourished. She is cooperative.   HENT:   Head: Normocephalic and atraumatic.   Right Ear: Hearing, external ear and ear canal normal. A middle ear  effusion is present.   Left Ear: External ear normal. There is drainage and tenderness. Tympanic membrane is perforated. Decreased hearing is noted.   Nose: Nose normal.   Mouth/Throat: Uvula is midline, oropharynx is clear and moist and mucous membranes are normal.   Eyes: Conjunctivae and EOM are normal.   Neck: Normal range of motion. Neck supple.   Cardiovascular: Normal rate, regular rhythm, S1 normal, S2 normal, normal heart sounds and normal pulses.    Pulmonary/Chest: Effort normal and breath sounds normal. No respiratory distress.   Lymphadenopathy:        Head (right side): No submental, no submandibular, no tonsillar, no preauricular and no posterior auricular adenopathy present.        Head (left side): No submental, no submandibular, no tonsillar, no preauricular and no posterior auricular adenopathy present.   Neurological: She is alert and oriented to person, place, and time.   Skin: Skin is warm and dry. Capillary refill takes less than 2 seconds.   Psychiatric: She has a normal mood and affect. Her speech is normal and behavior is normal. Judgment and thought content normal.   Nursing note and vitals reviewed.      Assessment & Plan:       Acute suppurative otitis media of left ear with spontaneous rupture of tympanic membrane, recurrence not specified  -     amoxicillin-clavulanate 875-125mg (AUGMENTIN) 875-125 mg per tablet; Take 1 tablet by mouth every 12 (twelve) hours.  Dispense: 20 tablet; Refill: 0  -     Ambulatory referral to ENT    May take ibuprofen OTC as directed for discomfort.  For moderate to severe pain, Dr Rowley will send Norco to pharmacy electronically.       Follow-up in about 2 weeks (around 3/12/2018), or if symptoms worsen or fail to improve.

## 2018-02-26 NOTE — TELEPHONE ENCOUNTER
----- Message from Rodolfo Lizarraga sent at 2/26/2018  1:30 PM CST -----  Contact: pt  Pt is calling to see if there is something that she can get called in for ear infection  Call Back#[phone]  Thanks    [facility] [id number] [place] [address] & [employer] [address] [place] [address]  Phone: [phone] Fax: [phone]

## 2018-02-27 ENCOUNTER — PATIENT MESSAGE (OUTPATIENT)
Dept: FAMILY MEDICINE | Facility: CLINIC | Age: 43
End: 2018-02-27

## 2018-03-02 ENCOUNTER — TELEPHONE (OUTPATIENT)
Dept: FAMILY MEDICINE | Facility: CLINIC | Age: 43
End: 2018-03-02

## 2018-03-02 RX ORDER — FLUCONAZOLE 150 MG/1
150 TABLET ORAL ONCE
Qty: 1 TABLET | Refills: 0 | Status: SHIPPED | OUTPATIENT
Start: 2018-03-02 | End: 2018-03-02

## 2018-03-02 NOTE — TELEPHONE ENCOUNTER
----- Message from RT Nancy sent at 3/2/2018  2:01 PM CST -----  Contact: pt   pt , requesting to inform she is still having difficulty with her hearing, thanks.

## 2018-03-02 NOTE — TELEPHONE ENCOUNTER
Please notify pt Diflucan po sent to her pharmacy. Continue antibiotic and pain med as prescribed and OTC NSAIDs as directed.

## 2018-03-02 NOTE — TELEPHONE ENCOUNTER
"Patient reports continued drainage from ear, continued pain in ear and down into jaw. Reports occasional chills and says she still feel "stopped up".     Also reports developing vaginal itching/burning since beginning current ABT regimen.     Offered same day appointment with PCP. Patient states has had recent visit with NP and is wondering if something needs to be changed to help symptoms    Please advise.   "

## 2018-03-14 ENCOUNTER — TELEPHONE (OUTPATIENT)
Dept: FAMILY MEDICINE | Facility: CLINIC | Age: 43
End: 2018-03-14

## 2018-03-14 NOTE — TELEPHONE ENCOUNTER
Patient had questions about bill she received from last visit. Advised patient that wellness visits must be exactly 1 year apart.

## 2018-03-14 NOTE — TELEPHONE ENCOUNTER
----- Message from Racheal Hernandez sent at 3/14/2018 12:18 PM CDT -----  Contact: self  Patient called regarding question about her visit on 2/5/18. Please contact [phone] (home)

## 2018-04-02 RX ORDER — CLOTRIMAZOLE AND BETAMETHASONE DIPROPIONATE 10; .64 MG/G; MG/G
CREAM TOPICAL
Qty: 45 G | Refills: 0 | OUTPATIENT
Start: 2018-04-02

## 2018-04-02 RX ORDER — CLOTRIMAZOLE AND BETAMETHASONE DIPROPIONATE 10; .64 MG/G; MG/G
CREAM TOPICAL 2 TIMES DAILY
Qty: 45 G | Refills: 0 | Status: SHIPPED | OUTPATIENT
Start: 2018-04-02 | End: 2018-07-23 | Stop reason: SDUPTHER

## 2018-04-02 NOTE — TELEPHONE ENCOUNTER
Refill Authorization Note     is requesting a refill authorization.    Brief assessment and rationale for refill: Quick DC: already pended to previous encounter                             Name and strength of medication: Lotrisone         Comments:

## 2018-05-01 NOTE — PROGRESS NOTES
Refill Authorization Note     is requesting a refill authorization.    Brief assessment and rationale for refill: DEFER: clonazepam/zolpidem/norco also on pt chart  Amount/Quantity of medication ordered: 90d         Refills Authorized: Yes  If authorized number of refills: 0           Medication Therapy Plan: Defer 3 more  Name and strength of medication: traZODone (DESYREL) 50 MG tablet  How patient will take medication: t1t po QHD   Medication reconciliation completed: No  Comments:   BP Readings from Last 3 Encounters:   02/26/18 120/82   02/05/18 104/62   09/25/17 108/82

## 2018-05-02 RX ORDER — ZOLPIDEM TARTRATE 10 MG/1
TABLET ORAL
Qty: 30 TABLET | Refills: 2 | Status: SHIPPED | OUTPATIENT
Start: 2018-05-02 | End: 2018-07-25 | Stop reason: SDUPTHER

## 2018-05-02 RX ORDER — TRAZODONE HYDROCHLORIDE 50 MG/1
50 TABLET ORAL NIGHTLY
Qty: 90 TABLET | Refills: 0 | Status: SHIPPED | OUTPATIENT
Start: 2018-05-02 | End: 2018-06-14 | Stop reason: SDUPTHER

## 2018-05-15 RX ORDER — CLONAZEPAM 1 MG/1
1 TABLET ORAL 2 TIMES DAILY PRN
Qty: 60 TABLET | Refills: 0 | Status: SHIPPED | OUTPATIENT
Start: 2018-05-15 | End: 2018-06-05 | Stop reason: SDUPTHER

## 2018-06-05 ENCOUNTER — OFFICE VISIT (OUTPATIENT)
Dept: FAMILY MEDICINE | Facility: CLINIC | Age: 43
End: 2018-06-05
Payer: COMMERCIAL

## 2018-06-05 VITALS
WEIGHT: 151.44 LBS | BODY MASS INDEX: 31.79 KG/M2 | SYSTOLIC BLOOD PRESSURE: 106 MMHG | DIASTOLIC BLOOD PRESSURE: 80 MMHG | HEIGHT: 58 IN | HEART RATE: 68 BPM

## 2018-06-05 DIAGNOSIS — G25.81 RESTLESS LEG: ICD-10-CM

## 2018-06-05 DIAGNOSIS — G47.26 SHIFT WORK SLEEP DISORDER: Primary | ICD-10-CM

## 2018-06-05 DIAGNOSIS — G47.00 INSOMNIA, UNSPECIFIED TYPE: ICD-10-CM

## 2018-06-05 DIAGNOSIS — F41.9 ANXIETY: ICD-10-CM

## 2018-06-05 DIAGNOSIS — Z12.39 BREAST CANCER SCREENING: ICD-10-CM

## 2018-06-05 PROCEDURE — 99214 OFFICE O/P EST MOD 30 MIN: CPT | Mod: S$GLB,,, | Performed by: FAMILY MEDICINE

## 2018-06-05 PROCEDURE — 3008F BODY MASS INDEX DOCD: CPT | Mod: CPTII,S$GLB,, | Performed by: FAMILY MEDICINE

## 2018-06-05 PROCEDURE — 99999 PR PBB SHADOW E&M-EST. PATIENT-LVL III: CPT | Mod: PBBFAC,,, | Performed by: FAMILY MEDICINE

## 2018-06-05 RX ORDER — CLONAZEPAM 1 MG/1
1 TABLET ORAL 2 TIMES DAILY PRN
Qty: 60 TABLET | Refills: 5 | Status: SHIPPED | OUTPATIENT
Start: 2018-06-14 | End: 2018-12-14 | Stop reason: SDUPTHER

## 2018-06-05 NOTE — PROGRESS NOTES
Subjective:       Patient ID: Jory Wheeler is a 42 y.o. female.    Chief Complaint: shift work sleep disorder and Anxiety    Here for f/u insomnia and chronic medical issues.  Doing well overall.      Anxiety   Presents for follow-up visit. Symptoms include nervous/anxious behavior. Patient reports no chest pain, palpitations, shortness of breath or suicidal ideas.         Review of Systems   Constitutional: Negative for chills, fatigue and fever.   Respiratory: Negative for cough, chest tightness and shortness of breath.    Cardiovascular: Negative for chest pain, palpitations and leg swelling.   Gastrointestinal: Negative for abdominal distention and abdominal pain.   Endocrine: Negative for cold intolerance and heat intolerance.   Skin: Negative for rash.   Psychiatric/Behavioral: Negative for suicidal ideas. The patient is nervous/anxious.        Objective:      Physical Exam   Constitutional: She appears well-developed and well-nourished.   HENT:   Head: Normocephalic and atraumatic.   Cardiovascular: Normal rate, regular rhythm and normal heart sounds.    Pulmonary/Chest: Effort normal and breath sounds normal.   Psychiatric: She has a normal mood and affect.   Nursing note and vitals reviewed.      Assessment:       1. Shift work sleep disorder    2. Insomnia, unspecified type    3. Anxiety    4. Restless leg    5. Breast cancer screening        Plan:       Shift work sleep disorder    Insomnia, unspecified type    Anxiety    Restless leg    Breast cancer screening  -     Mammo Digital Screening Bilat with CAD; Future; Expected date: 06/05/2018    Other orders  -     clonazePAM (KLONOPIN) 1 MG tablet; Take 1 tablet (1 mg total) by mouth 2 (two) times daily as needed.  Dispense: 60 tablet; Refill: 5      Update labs and health maintenance.  Will monitor chronic medical issues and continue current plan of care.      Follow-up in about 4 months (around 10/5/2018), or if symptoms worsen or fail to improve.

## 2018-06-15 RX ORDER — TRAZODONE HYDROCHLORIDE 50 MG/1
TABLET ORAL
Qty: 90 TABLET | Refills: 0 | Status: SHIPPED | OUTPATIENT
Start: 2018-06-15 | End: 2018-11-04 | Stop reason: SDUPTHER

## 2018-07-08 ENCOUNTER — TELEPHONE (OUTPATIENT)
Dept: FAMILY MEDICINE | Facility: CLINIC | Age: 43
End: 2018-07-08

## 2018-07-12 RX ORDER — PHENTERMINE HYDROCHLORIDE 37.5 MG/1
TABLET ORAL
Qty: 30 TABLET | Refills: 0 | OUTPATIENT
Start: 2018-07-12

## 2018-07-20 ENCOUNTER — PATIENT MESSAGE (OUTPATIENT)
Dept: FAMILY MEDICINE | Facility: CLINIC | Age: 43
End: 2018-07-20

## 2018-07-23 NOTE — PROGRESS NOTES
Refill Authorization Note     is requesting a refill authorization.    Brief assessment and rationale for refill: APPROVE; prr  Amount/Quantity of medication ordered: 90d  Date of last appointment: 6/5/2018     Refills Authorized: Yes  If authorized number of refills: 0           Medication Therapy Plan: Hx of eczema on hands (6/15 ov), but not commented on recently; Approve 3 more months   Name and strength of medication: clotrimazole-betamethasone 1-0.05% (LOTRISONE) cream  How patient will take medication: aaa; bid  Medication reconciliation completed: No  Comments:

## 2018-07-25 RX ORDER — CLOTRIMAZOLE AND BETAMETHASONE DIPROPIONATE 10; .64 MG/G; MG/G
CREAM TOPICAL 2 TIMES DAILY
Qty: 45 G | Refills: 0 | Status: SHIPPED | OUTPATIENT
Start: 2018-07-25 | End: 2019-03-07 | Stop reason: SDUPTHER

## 2018-07-25 NOTE — PROGRESS NOTES
Refill Authorization Note     is requesting a refill authorization.    Brief assessment and rationale for refill: DEFER: Zolpidem; APPROVE: Citalopram  Amount/Quantity of medication ordered: 90d        Refills Authorized: Yes  If authorized number of refills: 3           Medication Therapy Plan: BP controlled; last comment pt doing well, not sure if in remission; defer zolpidem to you; approve 12 more on citalopram  Name and strength of medication: CITALOPRAM 40MG TABLETS  How patient will take medication: t1t qd  Medication reconciliation completed: No  Comments:

## 2018-07-26 RX ORDER — CITALOPRAM 40 MG/1
TABLET, FILM COATED ORAL
Qty: 90 TABLET | Refills: 0 | Status: SHIPPED | OUTPATIENT
Start: 2018-07-26 | End: 2018-10-22 | Stop reason: SDUPTHER

## 2018-07-26 NOTE — PROGRESS NOTES
Refill Authorization Note     is requesting a refill authorization.    Brief assessment and rationale for refill: DEFER: Zolpidem; APPROVE: Citalopram  Amount/Quantity of medication ordered: 90d        Refills Authorized: Yes  If authorized number of refills: 3           Medication Therapy Plan: BP controlled; last comment pt doing well, not sure if in remission; defer zolpidem to you; approve 3 more on citalopram  Name and strength of medication: CITALOPRAM 40MG TABLETS  How patient will take medication: t1t qd  Medication reconciliation completed: No  Comments:

## 2018-07-27 RX ORDER — ZOLPIDEM TARTRATE 10 MG/1
TABLET ORAL
Qty: 30 TABLET | Refills: 0 | Status: SHIPPED | OUTPATIENT
Start: 2018-07-27 | End: 2018-08-27 | Stop reason: SDUPTHER

## 2018-08-27 RX ORDER — ZOLPIDEM TARTRATE 10 MG/1
TABLET ORAL
Qty: 30 TABLET | Refills: 0 | Status: SHIPPED | OUTPATIENT
Start: 2018-08-27 | End: 2018-09-26 | Stop reason: SDUPTHER

## 2018-09-12 ENCOUNTER — PATIENT MESSAGE (OUTPATIENT)
Dept: FAMILY MEDICINE | Facility: CLINIC | Age: 43
End: 2018-09-12

## 2018-09-27 DIAGNOSIS — G47.00 INSOMNIA, UNSPECIFIED TYPE: Primary | ICD-10-CM

## 2018-09-27 RX ORDER — ZOLPIDEM TARTRATE 10 MG/1
TABLET ORAL
Qty: 30 TABLET | Refills: 0 | Status: SHIPPED | OUTPATIENT
Start: 2018-09-27 | End: 2018-10-25 | Stop reason: SDUPTHER

## 2018-09-28 RX ORDER — ZOLPIDEM TARTRATE 10 MG/1
10 TABLET ORAL NIGHTLY
Qty: 30 TABLET | Refills: 0 | OUTPATIENT
Start: 2018-09-28

## 2018-10-23 RX ORDER — ZOLPIDEM TARTRATE 10 MG/1
TABLET ORAL
Qty: 30 TABLET | Refills: 0 | Status: CANCELLED | OUTPATIENT
Start: 2018-10-23

## 2018-10-24 ENCOUNTER — PATIENT OUTREACH (OUTPATIENT)
Dept: ADMINISTRATIVE | Facility: HOSPITAL | Age: 43
End: 2018-10-24

## 2018-10-24 RX ORDER — ZOLPIDEM TARTRATE 10 MG/1
10 TABLET ORAL NIGHTLY
Qty: 30 TABLET | Refills: 0 | OUTPATIENT
Start: 2018-10-24

## 2018-10-24 RX ORDER — CITALOPRAM 40 MG/1
TABLET, FILM COATED ORAL
Qty: 90 TABLET | Refills: 0 | Status: SHIPPED | OUTPATIENT
Start: 2018-10-24 | End: 2019-01-20 | Stop reason: SDUPTHER

## 2018-10-24 RX ORDER — CITALOPRAM 40 MG/1
TABLET, FILM COATED ORAL
Qty: 90 TABLET | Refills: 0 | OUTPATIENT
Start: 2018-10-24

## 2018-10-24 NOTE — PROGRESS NOTES
Health Maintenance Due   Topic Date Due    TETANUS VACCINE  08/16/1995    Mammogram  08/16/2017    Influenza Vaccine  08/01/2018

## 2018-10-25 ENCOUNTER — TELEPHONE (OUTPATIENT)
Dept: FAMILY MEDICINE | Facility: CLINIC | Age: 43
End: 2018-10-25

## 2018-10-25 RX ORDER — ZOLPIDEM TARTRATE 10 MG/1
10 TABLET ORAL NIGHTLY
Qty: 30 TABLET | Refills: 2 | Status: SHIPPED | OUTPATIENT
Start: 2018-10-25 | End: 2018-12-21 | Stop reason: SDUPTHER

## 2018-10-25 NOTE — TELEPHONE ENCOUNTER
----- Message from Jhonny Mitchell sent at 10/25/2018 11:15 AM CDT -----  Contact: pt  Type:  Pharmacy Calling to Clarify an RX    Name of Caller:  pt  Pharmacy Name:    [id number] - [place] [address] [email or web address] [facility] [email or web address]  [facility] [email or web address] [place] [id number]  Phone: [phone] Fax: [phone]    Prescription Name:  citalopram (CELEXA) 40 MG tablet and zolpidem (AMBIEN) 10 mg Tab  What do they need to clarify?:  Pt states both Rx were denied   Best Call Back Number:     Additional Information:  Pt is requesting a call back to discuss the reason for the denial

## 2018-11-01 ENCOUNTER — PATIENT OUTREACH (OUTPATIENT)
Dept: ADMINISTRATIVE | Facility: HOSPITAL | Age: 43
End: 2018-11-01

## 2018-11-01 NOTE — PROGRESS NOTES
Health Maintenance Due   Topic Date Due    TETANUS VACCINE  08/16/1995    Mammogram  08/16/2017    Influenza Vaccine  08/01/2018     Portal outreach un-read by patient.  Outreach mailed today

## 2018-11-01 NOTE — LETTER
November 1, 2018    Jroy Wheeler  [address] [facility] [address]             [facility]  [address]  [place] [address]  Phone: [phone] Dear Ms. Wheeler:    We have tried to reach you by [facility] email unsuccessfully.      [facility] is committed to your overall health.  To help you get the most out of each of your visits, we will review your information to make sure you are up to date on all of your recommended tests and/or procedures.       W Spike Rowley MD   has found that your chart shows you may be due for the following:     Influenza Vaccine   Tetanus Immunization   Mammogram     If you have had any of the above done at another facility, please bring the records or information with you so that your record at [facility] will be complete.  If you would like to schedule any of these, please contact me.      If you are currently taking medication , please bring it with you to your appointment for review.     Sincerely,    Cary Jerez  Clinical Care Coordinator  [facility] [address] [facility] [address]  [place] [address]  Phone: [phone]   Fax: [phone]

## 2018-11-06 RX ORDER — TRAZODONE HYDROCHLORIDE 50 MG/1
TABLET ORAL
Qty: 90 TABLET | Refills: 0 | Status: SHIPPED | OUTPATIENT
Start: 2018-11-06 | End: 2019-07-17 | Stop reason: SDUPTHER

## 2018-11-07 ENCOUNTER — OFFICE VISIT (OUTPATIENT)
Dept: FAMILY MEDICINE | Facility: CLINIC | Age: 43
End: 2018-11-07
Payer: COMMERCIAL

## 2018-11-07 VITALS
SYSTOLIC BLOOD PRESSURE: 120 MMHG | HEIGHT: 58 IN | WEIGHT: 155.88 LBS | DIASTOLIC BLOOD PRESSURE: 85 MMHG | BODY MASS INDEX: 32.72 KG/M2 | RESPIRATION RATE: 18 BRPM | HEART RATE: 66 BPM | OXYGEN SATURATION: 98 %

## 2018-11-07 DIAGNOSIS — F41.9 ANXIETY: ICD-10-CM

## 2018-11-07 DIAGNOSIS — G47.26 SHIFT WORK SLEEP DISORDER: ICD-10-CM

## 2018-11-07 DIAGNOSIS — Z00.00 WELLNESS EXAMINATION: Primary | ICD-10-CM

## 2018-11-07 DIAGNOSIS — G25.81 RESTLESS LEG: ICD-10-CM

## 2018-11-07 PROCEDURE — 3008F BODY MASS INDEX DOCD: CPT | Mod: CPTII,S$GLB,, | Performed by: FAMILY MEDICINE

## 2018-11-07 PROCEDURE — 99214 OFFICE O/P EST MOD 30 MIN: CPT | Mod: S$GLB,,, | Performed by: FAMILY MEDICINE

## 2018-11-07 PROCEDURE — 99999 PR PBB SHADOW E&M-EST. PATIENT-LVL III: CPT | Mod: PBBFAC,,, | Performed by: FAMILY MEDICINE

## 2018-11-07 NOTE — PROGRESS NOTES
Subjective:       Patient ID: Jory Wheeler is a 43 y.o. female.    Chief Complaint: Follow-up (anxiety and depression)    Here for f/u sleep and depression. Mood stable. Also some mild sore throat since last night. No fever.      Review of Systems   Constitutional: Negative for chills, fatigue and fever.   Respiratory: Negative for cough, chest tightness and shortness of breath.    Cardiovascular: Negative for chest pain, palpitations and leg swelling.   Endocrine: Negative for cold intolerance and heat intolerance.   Skin: Negative for rash.   Psychiatric/Behavioral: Positive for sleep disturbance. Negative for dysphoric mood and suicidal ideas. The patient is not nervous/anxious.        Objective:      Physical Exam   Constitutional: She appears well-developed and well-nourished.   HENT:   Head: Normocephalic and atraumatic.   Cardiovascular: Normal rate, regular rhythm and normal heart sounds.   Pulmonary/Chest: Effort normal and breath sounds normal.   Psychiatric: She has a normal mood and affect.   Nursing note and vitals reviewed.      Assessment:       1. Wellness examination    2. Restless leg    3. Anxiety    4. Shift work sleep disorder        Plan:       Wellness examination    Restless leg    Anxiety    Shift work sleep disorder      Tolerating meds and mood is good. Sleep is good with current medications.  Will monitor chronic medical issues and continue current plan of care.  Call if throat worsens.    Addendum: 12-17-18; recoding as wellness as annual labs were ordered and discussed during visit  Follow-up in about 4 months (around 3/7/2019), or if symptoms worsen or fail to improve.

## 2018-12-06 ENCOUNTER — PATIENT MESSAGE (OUTPATIENT)
Dept: FAMILY MEDICINE | Facility: CLINIC | Age: 43
End: 2018-12-06

## 2018-12-06 ENCOUNTER — TELEPHONE (OUTPATIENT)
Dept: FAMILY MEDICINE | Facility: CLINIC | Age: 43
End: 2018-12-06

## 2018-12-06 DIAGNOSIS — G47.26 SHIFT WORK SLEEP DISORDER: Primary | ICD-10-CM

## 2018-12-06 NOTE — TELEPHONE ENCOUNTER
----- Message from Dalia Sue sent at 12/6/2018 12:02 PM CST -----  Contact: Patient  Type: Needs Medical Advice    Who Called:  Patient  Symptoms (please be specific):  na  How long has patient had these symptoms:  dede  Pharmacy name and phone #:  dede  Best Call Back Number:   Additional Information: Calling to speak with the Nurse about trying a medication that she had before. Please advise.

## 2018-12-07 ENCOUNTER — PATIENT MESSAGE (OUTPATIENT)
Dept: FAMILY MEDICINE | Facility: CLINIC | Age: 43
End: 2018-12-07

## 2018-12-07 RX ORDER — MODAFINIL 100 MG/1
100 TABLET ORAL DAILY
Qty: 30 TABLET | Refills: 0 | Status: CANCELLED | OUTPATIENT
Start: 2018-12-07 | End: 2019-01-06

## 2018-12-07 NOTE — TELEPHONE ENCOUNTER
Has she seen sleep medicine lately? If not needs to see them; she is on too many controlled meds in my opinion to add provigil

## 2018-12-07 NOTE — TELEPHONE ENCOUNTER
Patient would like to get back on monafidil, once prescribed for her for shift work issues.     Medication pended as was historically prescribed.   Please advise.

## 2018-12-12 ENCOUNTER — PATIENT MESSAGE (OUTPATIENT)
Dept: FAMILY MEDICINE | Facility: CLINIC | Age: 43
End: 2018-12-12

## 2018-12-12 DIAGNOSIS — G47.26 SHIFT WORK SLEEP DISORDER: Primary | ICD-10-CM

## 2018-12-12 DIAGNOSIS — G25.81 RLS (RESTLESS LEGS SYNDROME): ICD-10-CM

## 2018-12-12 NOTE — TELEPHONE ENCOUNTER
Spoke to pt and she states that she has never seen sleep medicine. Pt states if she needs to see them she will she does not know if her insurance will cover a visit but will call to find out. Pt states she knows that it is her shift work that is causing her sleeping patterns to be irregular. Pt states she is willing to try something else as well.

## 2018-12-13 NOTE — TELEPHONE ENCOUNTER
Due to all the controlled meds referring to sleep medicine to get opinion on treatment options; can take current meds until then

## 2018-12-17 ENCOUNTER — TELEPHONE (OUTPATIENT)
Dept: FAMILY MEDICINE | Facility: CLINIC | Age: 43
End: 2018-12-17

## 2018-12-17 DIAGNOSIS — F41.9 ANXIETY: Primary | ICD-10-CM

## 2018-12-17 NOTE — TELEPHONE ENCOUNTER
----- Message from Abigail Sahu sent at 12/17/2018 11:22 AM CST -----  Contact: pt  Pt would like to speak with a nurse in regards to her well visit she had on 11-07-18  Pt stated changes needs to be made for insurance purposes. Pt stated she was over charged for her well visit.  Please call to advise  Call back   Thanks

## 2018-12-19 NOTE — TELEPHONE ENCOUNTER
Last visit with authorizing provider: JAYLEEN Rowley MD:  11/7/2018     Next visit with authorizing provider: JAYLEEN Rowley MD:  12/17/2018    Last e-scripted to Jacey on 06/05/18 for 6 month Supply

## 2018-12-20 RX ORDER — CLONAZEPAM 1 MG/1
1 TABLET ORAL 2 TIMES DAILY PRN
Qty: 60 TABLET | Refills: 5 | OUTPATIENT
Start: 2018-12-20

## 2018-12-20 RX ORDER — CLONAZEPAM 1 MG/1
TABLET ORAL
Qty: 60 TABLET | Refills: 0 | Status: SHIPPED | OUTPATIENT
Start: 2018-12-20 | End: 2019-01-18 | Stop reason: SDUPTHER

## 2018-12-21 RX ORDER — ZOLPIDEM TARTRATE 10 MG/1
10 TABLET ORAL NIGHTLY
Qty: 30 TABLET | Refills: 2 | Status: SHIPPED | OUTPATIENT
Start: 2018-12-21 | End: 2019-01-18 | Stop reason: SDUPTHER

## 2019-01-03 ENCOUNTER — PATIENT MESSAGE (OUTPATIENT)
Dept: FAMILY MEDICINE | Facility: CLINIC | Age: 44
End: 2019-01-03

## 2019-01-17 NOTE — TELEPHONE ENCOUNTER
LOV 5/26. Pt has appt with Dr. Rowley on 9/25. Please advise.    DISPLAY PLAN FREE TEXT DISPLAY PLAN FREE TEXT DISPLAY PLAN FREE TEXT DISPLAY PLAN FREE TEXT DISPLAY PLAN FREE TEXT DISPLAY PLAN FREE TEXT

## 2019-01-18 DIAGNOSIS — F41.9 ANXIETY: Primary | ICD-10-CM

## 2019-01-18 DIAGNOSIS — G47.26 SHIFT WORK SLEEP DISORDER: Primary | ICD-10-CM

## 2019-01-18 RX ORDER — ZOLPIDEM TARTRATE 10 MG/1
TABLET ORAL
Qty: 30 TABLET | Refills: 0 | OUTPATIENT
Start: 2019-01-18

## 2019-01-18 RX ORDER — ZOLPIDEM TARTRATE 10 MG/1
10 TABLET ORAL NIGHTLY
Qty: 30 TABLET | Refills: 2 | Status: SHIPPED | OUTPATIENT
Start: 2019-01-18 | End: 2019-04-15 | Stop reason: SDUPTHER

## 2019-01-18 RX ORDER — CLONAZEPAM 1 MG/1
TABLET ORAL
Qty: 60 TABLET | Refills: 0 | Status: SHIPPED | OUTPATIENT
Start: 2019-01-18 | End: 2019-02-17 | Stop reason: SDUPTHER

## 2019-01-22 ENCOUNTER — NURSE TRIAGE (OUTPATIENT)
Dept: ADMINISTRATIVE | Facility: CLINIC | Age: 44
End: 2019-01-22

## 2019-01-22 ENCOUNTER — TELEPHONE (OUTPATIENT)
Dept: FAMILY MEDICINE | Facility: CLINIC | Age: 44
End: 2019-01-22

## 2019-01-22 NOTE — TELEPHONE ENCOUNTER
Reason for Disposition   MODERATE-SEVERE rectal pain (i.e., interferes with school, work, or sleep)    Protocols used: ST RECTAL SYMPTOMS-A-OH    Jory called with moderate to severe rectal pain, just began yesterday.  She states she thinks r/t hemorrhoids. Unable to schedule her in [place] or with JAYLEEN Rowley MD, as no appointments available (she wants to be seen NOW).  Recommended she go to [facility] for initial evaluation.  She states she will.  Message to Dr Rowley. Please contact caller directly with any additional care advice.     Home care advice given, to include:  CALL BACK IF:  * Severe rectal pain or itching   * Acute rectal pain due to fecal impaction is not completely relieved after treatment   * Rectal pain or itching lasts over 3 days   * Rectal bleeding (i.e., more than just a few drops on toilet paper from wiping)   * You become worse

## 2019-01-22 NOTE — TELEPHONE ENCOUNTER
----- Message from Radha Low sent at 1/22/2019 10:41 AM CST -----  Contact: Patient  Type: Needs Medical Advice    Who Called:  Pt  Best Call Back Number: [phone] (home)   Additional Information:called regarding hemorrhoids want to know if she can come in today or give recommendations on what she should do. Would like a call back. Thanks.

## 2019-01-23 RX ORDER — CITALOPRAM 40 MG/1
TABLET, FILM COATED ORAL
Qty: 90 TABLET | Refills: 0 | Status: SHIPPED | OUTPATIENT
Start: 2019-01-23 | End: 2019-04-21 | Stop reason: SDUPTHER

## 2019-02-04 RX ORDER — TRAZODONE HYDROCHLORIDE 50 MG/1
TABLET ORAL
Qty: 90 TABLET | Refills: 0 | Status: SHIPPED | OUTPATIENT
Start: 2019-02-04 | End: 2019-03-07 | Stop reason: SDUPTHER

## 2019-02-17 DIAGNOSIS — F41.9 ANXIETY: ICD-10-CM

## 2019-02-19 RX ORDER — CLONAZEPAM 1 MG/1
TABLET ORAL
Qty: 60 TABLET | Refills: 2 | Status: SHIPPED | OUTPATIENT
Start: 2019-02-19 | End: 2019-04-15 | Stop reason: SDUPTHER

## 2019-03-07 ENCOUNTER — OFFICE VISIT (OUTPATIENT)
Dept: FAMILY MEDICINE | Facility: CLINIC | Age: 44
End: 2019-03-07
Payer: COMMERCIAL

## 2019-03-07 VITALS
TEMPERATURE: 98 F | OXYGEN SATURATION: 97 % | SYSTOLIC BLOOD PRESSURE: 122 MMHG | BODY MASS INDEX: 34.33 KG/M2 | HEART RATE: 109 BPM | DIASTOLIC BLOOD PRESSURE: 78 MMHG | HEIGHT: 58 IN | WEIGHT: 163.56 LBS

## 2019-03-07 DIAGNOSIS — G47.26 SHIFT WORK SLEEP DISORDER: ICD-10-CM

## 2019-03-07 DIAGNOSIS — J06.9 UPPER RESPIRATORY TRACT INFECTION, UNSPECIFIED TYPE: ICD-10-CM

## 2019-03-07 DIAGNOSIS — Z00.00 WELLNESS EXAMINATION: Primary | ICD-10-CM

## 2019-03-07 DIAGNOSIS — Z12.39 SCREENING FOR BREAST CANCER: ICD-10-CM

## 2019-03-07 DIAGNOSIS — G47.00 INSOMNIA, UNSPECIFIED TYPE: Primary | ICD-10-CM

## 2019-03-07 PROCEDURE — 3008F PR BODY MASS INDEX (BMI) DOCUMENTED: ICD-10-PCS | Mod: CPTII,S$GLB,, | Performed by: FAMILY MEDICINE

## 2019-03-07 PROCEDURE — 99214 PR OFFICE/OUTPT VISIT, EST, LEVL IV, 30-39 MIN: ICD-10-PCS | Mod: S$GLB,,, | Performed by: FAMILY MEDICINE

## 2019-03-07 PROCEDURE — 99999 PR PBB SHADOW E&M-EST. PATIENT-LVL III: ICD-10-PCS | Mod: PBBFAC,,, | Performed by: FAMILY MEDICINE

## 2019-03-07 PROCEDURE — 99999 PR PBB SHADOW E&M-EST. PATIENT-LVL III: CPT | Mod: PBBFAC,,, | Performed by: FAMILY MEDICINE

## 2019-03-07 PROCEDURE — 99214 OFFICE O/P EST MOD 30 MIN: CPT | Mod: S$GLB,,, | Performed by: FAMILY MEDICINE

## 2019-03-07 PROCEDURE — 3008F BODY MASS INDEX DOCD: CPT | Mod: CPTII,S$GLB,, | Performed by: FAMILY MEDICINE

## 2019-03-07 RX ORDER — HYDROCORTISONE 2.5% 25 MG/G
CREAM TOPICAL
Refills: 1 | COMMUNITY
Start: 2019-01-22 | End: 2020-06-08

## 2019-03-07 RX ORDER — CLOTRIMAZOLE AND BETAMETHASONE DIPROPIONATE 10; .64 MG/G; MG/G
CREAM TOPICAL 2 TIMES DAILY
Qty: 45 G | Refills: 0 | Status: SHIPPED | OUTPATIENT
Start: 2019-03-07 | End: 2019-11-06 | Stop reason: SDUPTHER

## 2019-03-19 LAB
ALBUMIN SERPL-MCNC: 4 G/DL (ref 3.6–5.1)
ALBUMIN/GLOB SERPL: 1.7 (CALC) (ref 1–2.5)
ALP SERPL-CCNC: 37 U/L (ref 33–115)
ALT SERPL-CCNC: 13 U/L (ref 6–29)
AST SERPL-CCNC: 15 U/L (ref 10–30)
BASOPHILS # BLD AUTO: 33 CELLS/UL (ref 0–200)
BASOPHILS NFR BLD AUTO: 0.5 %
BILIRUB SERPL-MCNC: 0.4 MG/DL (ref 0.2–1.2)
BUN SERPL-MCNC: 18 MG/DL (ref 7–25)
BUN/CREAT SERPL: NORMAL (CALC) (ref 6–22)
CALCIUM SERPL-MCNC: 8.8 MG/DL (ref 8.6–10.2)
CHLORIDE SERPL-SCNC: 106 MMOL/L (ref 98–110)
CHOLEST SERPL-MCNC: 156 MG/DL
CHOLEST/HDLC SERPL: 2.8 (CALC)
CO2 SERPL-SCNC: 25 MMOL/L (ref 20–32)
CREAT SERPL-MCNC: 0.65 MG/DL (ref 0.5–1.1)
EOSINOPHIL # BLD AUTO: 59 CELLS/UL (ref 15–500)
EOSINOPHIL NFR BLD AUTO: 0.9 %
ERYTHROCYTE [DISTWIDTH] IN BLOOD BY AUTOMATED COUNT: 12.2 % (ref 11–15)
GFRSERPLBLD MDRD-ARVRAT: 110 ML/MIN/1.73M2
GLOBULIN SER CALC-MCNC: 2.3 G/DL (CALC) (ref 1.9–3.7)
GLUCOSE SERPL-MCNC: 95 MG/DL (ref 65–99)
HCT VFR BLD AUTO: 35.3 % (ref 35–45)
HDLC SERPL-MCNC: 56 MG/DL
HGB BLD-MCNC: 11.7 G/DL (ref 11.7–15.5)
LDLC SERPL CALC-MCNC: 83 MG/DL (CALC)
LYMPHOCYTES # BLD AUTO: 2256 CELLS/UL (ref 850–3900)
LYMPHOCYTES NFR BLD AUTO: 34.7 %
MCH RBC QN AUTO: 31.2 PG (ref 27–33)
MCHC RBC AUTO-ENTMCNC: 33.1 G/DL (ref 32–36)
MCV RBC AUTO: 94.1 FL (ref 80–100)
MONOCYTES # BLD AUTO: 312 CELLS/UL (ref 200–950)
MONOCYTES NFR BLD AUTO: 4.8 %
NEUTROPHILS # BLD AUTO: 3842 CELLS/UL (ref 1500–7800)
NEUTROPHILS NFR BLD AUTO: 59.1 %
NONHDLC SERPL-MCNC: 100 MG/DL (CALC)
PLATELET # BLD AUTO: 313 THOUSAND/UL (ref 140–400)
PMV BLD REES-ECKER: 10.1 FL (ref 7.5–12.5)
POTASSIUM SERPL-SCNC: 4.3 MMOL/L (ref 3.5–5.3)
PROT SERPL-MCNC: 6.3 G/DL (ref 6.1–8.1)
RBC # BLD AUTO: 3.75 MILLION/UL (ref 3.8–5.1)
SODIUM SERPL-SCNC: 140 MMOL/L (ref 135–146)
TRIGL SERPL-MCNC: 82 MG/DL
TSH SERPL-ACNC: 0.7 MIU/L
WBC # BLD AUTO: 6.5 THOUSAND/UL (ref 3.8–10.8)

## 2019-04-15 DIAGNOSIS — F41.9 ANXIETY: ICD-10-CM

## 2019-04-16 RX ORDER — ZOLPIDEM TARTRATE 10 MG/1
10 TABLET ORAL NIGHTLY
Qty: 30 TABLET | Refills: 2 | Status: SHIPPED | OUTPATIENT
Start: 2019-04-16 | End: 2019-08-06

## 2019-04-16 RX ORDER — CLONAZEPAM 1 MG/1
1 TABLET ORAL 2 TIMES DAILY PRN
Qty: 60 TABLET | Refills: 2 | Status: SHIPPED | OUTPATIENT
Start: 2019-04-16 | End: 2019-05-14 | Stop reason: SDUPTHER

## 2019-04-22 RX ORDER — CITALOPRAM 40 MG/1
TABLET, FILM COATED ORAL
Qty: 90 TABLET | Refills: 0 | Status: SHIPPED | OUTPATIENT
Start: 2019-04-22 | End: 2019-07-17 | Stop reason: SDUPTHER

## 2019-05-03 RX ORDER — TRAZODONE HYDROCHLORIDE 50 MG/1
TABLET ORAL
Qty: 90 TABLET | Refills: 0 | OUTPATIENT
Start: 2019-05-03

## 2019-05-03 RX ORDER — TRAZODONE HYDROCHLORIDE 50 MG/1
TABLET ORAL
Qty: 90 TABLET | Refills: 0 | Status: SHIPPED | OUTPATIENT
Start: 2019-05-03 | End: 2019-07-17 | Stop reason: SDUPTHER

## 2019-05-14 DIAGNOSIS — F41.9 ANXIETY: ICD-10-CM

## 2019-05-14 RX ORDER — CLONAZEPAM 1 MG/1
1 TABLET ORAL 2 TIMES DAILY PRN
Qty: 60 TABLET | Refills: 2 | Status: SHIPPED | OUTPATIENT
Start: 2019-05-14 | End: 2019-08-08 | Stop reason: SDUPTHER

## 2019-06-12 ENCOUNTER — PATIENT OUTREACH (OUTPATIENT)
Dept: ADMINISTRATIVE | Facility: HOSPITAL | Age: 44
End: 2019-06-12

## 2019-06-12 NOTE — LETTER
June 20, 2019    Jory NIETO Liam  [address] [facility]  [place] [address]             [facility]  [address]  [place] [address]  Phone: [phone] Dear Mrs. Wheeler:    We have tried to reach you by mychart unsuccessfully.    [facility] is committed to your overall health.  To help you get the most out of each of your visits, we will review your information to make sure you are up to date on all of your recommended tests and/or procedures.       DARLYN Rowley MD   has found that your chart shows you may be due for the following:     TETANUS VACCINE   Mammogram, order placed     If you have had any of the above done at another facility, please bring the records with you or fax them to [phone] so that your record at [facility] will be complete. If you have not had any of these tests or procedures done recently and would like to complete this testing ,  please call [phone] or send a message through your [facility] portal to your provider's office.     If you have an upcoming scheduled appointment for the above test and/or procedures, please disregard this letter.     If you are currently taking medication, please bring it with you to your appointment for review.     Sincerely,     [facility] Cary Puentes   Clinical Care Coordinator   [facility]         If you have any questions or concerns, please don't hesitate to call.

## 2019-06-12 NOTE — PROGRESS NOTES
Health Maintenance Due   Topic Date Due    TETANUS VACCINE  08/16/1995    Mammogram  08/16/2017     Chart review completed 06/12/2019  Future Appointments   Date Time Provider Department Center   6/28/2019  2:40 PM JAYLEEN Rowley MD [facility]

## 2019-07-08 ENCOUNTER — TELEPHONE (OUTPATIENT)
Dept: FAMILY MEDICINE | Facility: CLINIC | Age: 44
End: 2019-07-08

## 2019-07-08 NOTE — TELEPHONE ENCOUNTER
----- Message from Susanna Hillman sent at 7/8/2019 12:29 PM CDT -----  Contact: pt   Calling in regards to missed call to reschedule and please advise [phone] (home)

## 2019-07-09 RX ORDER — ZOLPIDEM TARTRATE 10 MG/1
TABLET ORAL
Qty: 30 TABLET | Refills: 0 | Status: SHIPPED | OUTPATIENT
Start: 2019-07-09 | End: 2019-07-17 | Stop reason: SDUPTHER

## 2019-07-17 ENCOUNTER — OFFICE VISIT (OUTPATIENT)
Dept: FAMILY MEDICINE | Facility: CLINIC | Age: 44
End: 2019-07-17
Payer: COMMERCIAL

## 2019-07-17 VITALS
WEIGHT: 150.81 LBS | SYSTOLIC BLOOD PRESSURE: 128 MMHG | HEIGHT: 58 IN | OXYGEN SATURATION: 98 % | DIASTOLIC BLOOD PRESSURE: 86 MMHG | HEART RATE: 94 BPM | BODY MASS INDEX: 31.66 KG/M2

## 2019-07-17 DIAGNOSIS — F32.1 CURRENT MODERATE EPISODE OF MAJOR DEPRESSIVE DISORDER, UNSPECIFIED WHETHER RECURRENT: ICD-10-CM

## 2019-07-17 DIAGNOSIS — G47.00 INSOMNIA, UNSPECIFIED TYPE: Primary | ICD-10-CM

## 2019-07-17 DIAGNOSIS — Z91.09 ENVIRONMENTAL ALLERGIES: ICD-10-CM

## 2019-07-17 PROCEDURE — 99999 PR PBB SHADOW E&M-EST. PATIENT-LVL III: ICD-10-PCS | Mod: PBBFAC,,, | Performed by: INTERNAL MEDICINE

## 2019-07-17 PROCEDURE — 3008F BODY MASS INDEX DOCD: CPT | Mod: CPTII,S$GLB,, | Performed by: INTERNAL MEDICINE

## 2019-07-17 PROCEDURE — 99999 PR PBB SHADOW E&M-EST. PATIENT-LVL III: CPT | Mod: PBBFAC,,, | Performed by: INTERNAL MEDICINE

## 2019-07-17 PROCEDURE — 99213 OFFICE O/P EST LOW 20 MIN: CPT | Mod: S$GLB,,, | Performed by: INTERNAL MEDICINE

## 2019-07-17 PROCEDURE — 99213 PR OFFICE/OUTPT VISIT, EST, LEVL III, 20-29 MIN: ICD-10-PCS | Mod: S$GLB,,, | Performed by: INTERNAL MEDICINE

## 2019-07-17 PROCEDURE — 3008F PR BODY MASS INDEX (BMI) DOCUMENTED: ICD-10-PCS | Mod: CPTII,S$GLB,, | Performed by: INTERNAL MEDICINE

## 2019-07-17 RX ORDER — CITALOPRAM 40 MG/1
TABLET, FILM COATED ORAL
Qty: 90 TABLET | Refills: 0 | Status: SHIPPED | OUTPATIENT
Start: 2019-07-17 | End: 2019-10-15 | Stop reason: SDUPTHER

## 2019-07-17 RX ORDER — TRAZODONE HYDROCHLORIDE 50 MG/1
TABLET ORAL
Qty: 90 TABLET | Refills: 0 | Status: SHIPPED | OUTPATIENT
Start: 2019-07-17 | End: 2019-10-09

## 2019-07-17 RX ORDER — AZELASTINE HYDROCHLORIDE 0.5 MG/ML
SOLUTION/ DROPS OPHTHALMIC
Refills: 0 | COMMUNITY
Start: 2019-07-15

## 2019-07-17 RX ORDER — MONTELUKAST SODIUM 10 MG/1
10 TABLET ORAL NIGHTLY
Qty: 10 TABLET | Refills: 0 | Status: SHIPPED | OUTPATIENT
Start: 2019-07-17 | End: 2019-07-27 | Stop reason: SDUPTHER

## 2019-07-17 NOTE — PROGRESS NOTES
Subjective:       Patient ID: Jory Wheeler is a 43 y.o. female.    Chief Complaint: Medication Refill    HPI     Past medical history:  Depression anxiety, restless legs, insomnia, hypothyroidism    Labs in March reveal normal TSH, lipid panel, CMP.  CBC shows slightly low RBC.    She is here today for medication refills (trazadone and citalopram).   She is falling asleep well with clonazepam nightly. (takes ambien and trazodone prn)   Recently had steroid injection 2/2 allergy to horse. States it has been a hard week (slightly tearful). Denies suicidality and homicidality.     Horse allergy:   Her kids may be volunteering at horse farm.  Will try montelukast prior to more exposure.     Current Outpatient Medications on File Prior to Visit   Medication Sig Dispense Refill    azelastine (OPTIVAR) 0.05 % ophthalmic solution   0    clonazePAM (KLONOPIN) 1 MG tablet Take 1 tablet (1 mg total) by mouth 2 (two) times daily as needed for Anxiety. 60 tablet 2    clotrimazole-betamethasone 1-0.05% (LOTRISONE) cream Apply topically 2 (two) times daily. 45 g 0    estradiol (ESTRACE) 1 MG tablet       ibuprofen (ADVIL,MOTRIN) 600 MG tablet Take 1 tablet (600 mg total) by mouth every 6 (six) hours as needed for Pain. 40 tablet 0    levothyroxine (SYNTHROID) 50 MCG tablet Take 50 mcg by mouth once daily.      zolpidem (AMBIEN) 10 mg Tab Take 1 tablet (10 mg total) by mouth nightly. 30 tablet 2    [DISCONTINUED] citalopram (CELEXA) 40 MG tablet TAKE 1 TABLET(40 MG) BY MOUTH EVERY DAY 90 tablet 0    [DISCONTINUED] traZODone (DESYREL) 50 MG tablet TAKE 1 TABLET(50 MG) BY MOUTH EVERY EVENING 90 tablet 0    [DISCONTINUED] traZODone (DESYREL) 50 MG tablet TAKE 1 TABLET(50 MG) BY MOUTH EVERY EVENING 90 tablet 0    acyclovir (ZOVIRAX) 400 MG tablet Take 400 mg by mouth as needed.      mupirocin (BACTROBAN) 2 % ointment Apply topically 2 (two) times daily. 22 g 0    PROCTOZONE-HC 2.5 % rectal cream APPLY RECTALLY TID FOR 2  WEEKS UTD  1    [DISCONTINUED] zolpidem (AMBIEN) 10 mg Tab TAKE 1 TABLET(10 MG) BY MOUTH EVERY NIGHT 30 tablet 0     No current facility-administered medications on file prior to visit.      I personally reviewed past medical, family and social history.  Review of Systems   Constitutional: Negative for activity change, fever and unexpected weight change.   HENT: Negative for facial swelling, hearing loss and trouble swallowing.    Eyes: Positive for redness. Negative for visual disturbance.   Respiratory: Negative for cough, chest tightness, shortness of breath and wheezing.    Cardiovascular: Negative for chest pain, palpitations and leg swelling.   Gastrointestinal: Negative for abdominal pain, blood in stool, constipation, diarrhea, nausea and vomiting.   Endocrine: Negative for cold intolerance, polydipsia, polyphagia and polyuria.   Genitourinary: Negative for decreased urine volume and dysuria.   Musculoskeletal: Negative for gait problem and neck pain.   Skin: Negative for rash.   Neurological: Negative for dizziness, syncope and light-headedness.   Psychiatric/Behavioral: Positive for dysphoric mood. The patient is not nervous/anxious.        Objective:     Vitals:    07/17/19 1357   BP: 128/86   Pulse: 94        Physical Exam   Constitutional: She is oriented to person, place, and time. She appears well-developed and well-nourished. No distress.   HENT:   Head: Normocephalic and atraumatic.   Eyes: Pupils are equal, round, and reactive to light. EOM are normal. Right eye exhibits no discharge. Left eye exhibits no discharge. No scleral icterus.   Bilateral conjunctival injection.  Improved from pictures that she showed me on her phone.   Neck: Normal range of motion. Neck supple. No JVD present. No thyromegaly present.   Cardiovascular: Normal rate, regular rhythm and normal heart sounds. Exam reveals no gallop and no friction rub.   No murmur heard.  Pulmonary/Chest: Effort normal and breath sounds  normal. No respiratory distress. She has no wheezes.   Abdominal: Soft. Bowel sounds are normal. She exhibits no distension and no mass. There is no tenderness.   Musculoskeletal: Normal range of motion. She exhibits no edema.   Lymphadenopathy:     She has no cervical adenopathy.   Neurological: She is alert and oriented to person, place, and time. No cranial nerve deficit. Coordination normal.   Skin: Skin is warm and dry. Capillary refill takes less than 2 seconds. No rash noted. She is not diaphoretic.   Psychiatric: She has a normal mood and affect. Her behavior is normal.         Lab Results   Component Value Date    WBC 6.5 03/18/2019    HGB 11.7 03/18/2019    HCT 35.3 03/18/2019    MCV 94.1 03/18/2019     03/18/2019      CMP  Sodium   Date Value Ref Range Status   03/18/2019 140 135 - 146 mmol/L Final     Potassium   Date Value Ref Range Status   03/18/2019 4.3 3.5 - 5.3 mmol/L Final     Chloride   Date Value Ref Range Status   03/18/2019 106 98 - 110 mmol/L Final     CO2   Date Value Ref Range Status   03/18/2019 25 20 - 32 mmol/L Final     Glucose   Date Value Ref Range Status   03/18/2019 95 65 - 99 mg/dL Final     Comment:                   Fasting reference interval          BUN, Bld   Date Value Ref Range Status   03/18/2019 18 7 - 25 mg/dL Final     Creatinine   Date Value Ref Range Status   03/18/2019 0.65 0.50 - 1.10 mg/dL Final     Calcium   Date Value Ref Range Status   03/18/2019 8.8 8.6 - 10.2 mg/dL Final     Total Protein   Date Value Ref Range Status   03/18/2019 6.3 6.1 - 8.1 g/dL Final     Albumin   Date Value Ref Range Status   03/18/2019 4.0 3.6 - 5.1 g/dL Final     Total Bilirubin   Date Value Ref Range Status   03/18/2019 0.4 0.2 - 1.2 mg/dL Final     Alkaline Phosphatase   Date Value Ref Range Status   03/18/2019 37 33 - 115 U/L Final     AST   Date Value Ref Range Status   03/18/2019 15 10 - 30 U/L Final     ALT   Date Value Ref Range Status   03/18/2019 13 6 - 29 U/L Final      Anion Gap   Date Value Ref Range Status   10/05/2015 11 8 - 16 mmol/L Final     eGFR if    Date Value Ref Range Status   03/18/2019 128 > OR = 60 mL/min/1.73m2 Final     eGFR if non    Date Value Ref Range Status   03/18/2019 110 > OR = 60 mL/min/1.73m2 Final      Lab Results   Component Value Date    CHOL 156 03/18/2019    CHOL 156 01/26/2018    CHOL 140 06/10/2013     Lab Results   Component Value Date    HDL 56 03/18/2019    HDL 52 01/26/2018    HDL 53 06/10/2013     Lab Results   Component Value Date    LDLCALC 83 03/18/2019    LDLCALC 88 01/26/2018    LDLCALC 63.0 06/10/2013     Lab Results   Component Value Date    TRIG 82 03/18/2019    TRIG 69 01/26/2018    TRIG 118 06/10/2013     Lab Results   Component Value Date    CHOLHDL 2.8 03/18/2019    CHOLHDL 3.0 01/26/2018    CHOLHDL 37.9 06/10/2013      Lab Results   Component Value Date    TSH 0.70 03/18/2019       Assessment/Plan   Jory was seen today for medication refill.    Diagnoses and all orders for this visit:    Insomnia, unspecified type  -     traZODone (DESYREL) 50 MG tablet; TAKE 1 TABLET(50 MG) BY MOUTH EVERY EVENING    Current moderate episode of major depressive disorder, unspecified whether recurrent  -     citalopram (CELEXA) 40 MG tablet; TAKE 1 TABLET(40 MG) BY MOUTH EVERY DAY    Environmental allergies  -     montelukast (SINGULAIR) 10 mg tablet; Take 1 tablet (10 mg total) by mouth every evening.

## 2019-07-22 RX ORDER — CITALOPRAM 40 MG/1
TABLET, FILM COATED ORAL
Qty: 90 TABLET | Refills: 0 | OUTPATIENT
Start: 2019-07-22

## 2019-07-27 DIAGNOSIS — Z91.09 ENVIRONMENTAL ALLERGIES: ICD-10-CM

## 2019-07-27 RX ORDER — MONTELUKAST SODIUM 10 MG/1
TABLET ORAL
Qty: 10 TABLET | Refills: 0 | Status: SHIPPED | OUTPATIENT
Start: 2019-07-27 | End: 2019-08-03 | Stop reason: SDUPTHER

## 2019-08-03 DIAGNOSIS — Z91.09 ENVIRONMENTAL ALLERGIES: ICD-10-CM

## 2019-08-04 DIAGNOSIS — G47.00 INSOMNIA, UNSPECIFIED TYPE: Primary | ICD-10-CM

## 2019-08-05 RX ORDER — MONTELUKAST SODIUM 10 MG/1
TABLET ORAL
Qty: 10 TABLET | Refills: 2 | Status: SHIPPED | OUTPATIENT
Start: 2019-08-05 | End: 2020-03-05

## 2019-08-06 RX ORDER — ZOLPIDEM TARTRATE 10 MG/1
10 TABLET ORAL NIGHTLY
Qty: 30 TABLET | Refills: 2 | Status: CANCELLED | OUTPATIENT
Start: 2019-08-06

## 2019-08-06 RX ORDER — ZOLPIDEM TARTRATE 10 MG/1
TABLET ORAL
Qty: 30 TABLET | Refills: 0 | Status: SHIPPED | OUTPATIENT
Start: 2019-08-06 | End: 2019-09-03 | Stop reason: SDUPTHER

## 2019-08-08 DIAGNOSIS — F41.9 ANXIETY: ICD-10-CM

## 2019-08-09 RX ORDER — CLONAZEPAM 1 MG/1
TABLET ORAL
Qty: 60 TABLET | Refills: 0 | Status: SHIPPED | OUTPATIENT
Start: 2019-08-09 | End: 2019-09-07 | Stop reason: SDUPTHER

## 2019-08-09 NOTE — PROGRESS NOTES
Refill Authorization Note     is requesting a refill authorization.    Brief assessment and rationale for refill: ROUTE; npp (non participating provider)   Name and strength of medication: clonazePAM (KLONOPIN) 1 MG tablet       Medication Therapy Plan: Non participating provider in Refill [facility],  Route to you     Medication reconciliation completed: No              How patient will take medication: t1t po bid prn anxiety          Comments:

## 2019-08-26 ENCOUNTER — TELEPHONE (OUTPATIENT)
Dept: FAMILY MEDICINE | Facility: CLINIC | Age: 44
End: 2019-08-26

## 2019-08-26 NOTE — TELEPHONE ENCOUNTER
----- Message from Eh Mustafa sent at 8/26/2019  1:16 PM CDT -----  Contact: self  Placed call to pod, patient miss call from your office please call back at [phone] (home)
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
## 2019-08-26 NOTE — TELEPHONE ENCOUNTER
Scheduled patient with Dr. gray on 8/28/19 because she didn't want to come in and see anyone else today or tomorrow. I advised her that if her neck or head starts hurting to bad to go back to the er.

## 2019-08-30 ENCOUNTER — TELEPHONE (OUTPATIENT)
Dept: FAMILY MEDICINE | Facility: CLINIC | Age: 44
End: 2019-08-30

## 2019-08-30 ENCOUNTER — OFFICE VISIT (OUTPATIENT)
Dept: FAMILY MEDICINE | Facility: CLINIC | Age: 44
End: 2019-08-30
Payer: COMMERCIAL

## 2019-08-30 VITALS
HEART RATE: 88 BPM | WEIGHT: 153.69 LBS | OXYGEN SATURATION: 97 % | BODY MASS INDEX: 32.26 KG/M2 | HEIGHT: 58 IN | SYSTOLIC BLOOD PRESSURE: 126 MMHG | DIASTOLIC BLOOD PRESSURE: 76 MMHG
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
## 2019-08-30 DIAGNOSIS — M54.2 NECK PAIN: Primary | ICD-10-CM

## 2019-08-30 PROCEDURE — 3008F BODY MASS INDEX DOCD: CPT | Mod: CPTII,S$GLB,, | Performed by: INTERNAL MEDICINE

## 2019-08-30 PROCEDURE — 99214 OFFICE O/P EST MOD 30 MIN: CPT | Mod: S$GLB,,, | Performed by: INTERNAL MEDICINE

## 2019-08-30 PROCEDURE — 99999 PR PBB SHADOW E&M-EST. PATIENT-LVL III: ICD-10-PCS | Mod: PBBFAC,,, | Performed by: INTERNAL MEDICINE

## 2019-08-30 PROCEDURE — 99214 PR OFFICE/OUTPT VISIT, EST, LEVL IV, 30-39 MIN: ICD-10-PCS | Mod: S$GLB,,, | Performed by: INTERNAL MEDICINE

## 2019-08-30 PROCEDURE — 99999 PR PBB SHADOW E&M-EST. PATIENT-LVL III: CPT | Mod: PBBFAC,,, | Performed by: INTERNAL MEDICINE

## 2019-08-30 PROCEDURE — 3008F PR BODY MASS INDEX (BMI) DOCUMENTED: ICD-10-PCS | Mod: CPTII,S$GLB,, | Performed by: INTERNAL MEDICINE

## 2019-08-30 RX ORDER — ONDANSETRON 4 MG/1
4 TABLET, FILM COATED ORAL 2 TIMES DAILY
Qty: 20 TABLET | Refills: 0 | Status: SHIPPED | OUTPATIENT
Start: 2019-08-30 | End: 2020-06-08

## 2019-08-30 RX ORDER — HYDROCODONE BITARTRATE AND ACETAMINOPHEN 5; 325 MG/1; MG/1
1 TABLET ORAL EVERY 6 HOURS PRN
Qty: 21 TABLET | Refills: 0 | Status: SHIPPED | OUTPATIENT
Start: 2019-08-30 | End: 2019-09-23 | Stop reason: SDUPTHER

## 2019-08-30 RX ORDER — CYCLOBENZAPRINE HCL 5 MG
5 TABLET ORAL 3 TIMES DAILY PRN
Qty: 30 TABLET | Refills: 0 | Status: SHIPPED | OUTPATIENT
Start: 2019-08-30 | End: 2019-09-09

## 2019-08-30 RX ORDER — DIETHYLPROPION HYDROCHLORIDE 75 MG/1
TABLET, EXTENDED RELEASE ORAL
Refills: 0 | COMMUNITY
Start: 2019-08-05 | End: 2019-12-04
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
## 2019-08-30 NOTE — TELEPHONE ENCOUNTER
Patient was told its ok to wait until next Friday for mri per dr gray unless her hands become numb and that she doesn't have to immobilize her neck.

## 2019-08-30 NOTE — TELEPHONE ENCOUNTER
----- Message from [place] sent at 8/30/2019 11:33 AM CDT -----  Contact: patient   Patient was seen today and has a question,  Please call back at [phone] (home)

## 2019-08-30 NOTE — PATIENT INSTRUCTIONS
Will do 7 days course of norco 5 mg TID. zofran for nausea. Discussed not taking with clonazepam or ambien

## 2019-09-03 RX ORDER — ZOLPIDEM TARTRATE 10 MG/1
TABLET ORAL
Qty: 30 TABLET | Refills: 0 | Status: SHIPPED | OUTPATIENT
Start: 2019-09-03 | End: 2019-09-27 | Stop reason: SDUPTHER

## 2019-09-07 DIAGNOSIS — F41.9 ANXIETY: ICD-10-CM

## 2019-09-09 ENCOUNTER — PATIENT MESSAGE (OUTPATIENT)
Dept: FAMILY MEDICINE | Facility: CLINIC | Age: 44
End: 2019-09-09

## 2019-09-09 DIAGNOSIS — F41.9 ANXIETY: ICD-10-CM

## 2019-09-10 RX ORDER — CLONAZEPAM 1 MG/1
TABLET ORAL
Qty: 60 TABLET | Refills: 0 | Status: SHIPPED | OUTPATIENT
Start: 2019-09-10 | End: 2019-10-07 | Stop reason: SDUPTHER

## 2019-09-10 RX ORDER — CLONAZEPAM 1 MG/1
TABLET ORAL
Qty: 60 TABLET | Refills: 0 | OUTPATIENT
Start: 2019-09-10

## 2019-09-13 ENCOUNTER — PATIENT MESSAGE (OUTPATIENT)
Dept: FAMILY MEDICINE | Facility: CLINIC | Age: 44
End: 2019-09-13

## 2019-09-16 ENCOUNTER — TELEPHONE (OUTPATIENT)
Dept: FAMILY MEDICINE | Facility: CLINIC | Age: 44
End: 2019-09-16

## 2019-09-16 DIAGNOSIS — M54.2 CERVICALGIA: Primary | ICD-10-CM

## 2019-09-16 NOTE — TELEPHONE ENCOUNTER
----- Message from Pina Parr sent at 9/16/2019  3:58 PM CDT -----  Contact: Pt  Type: Needs Medical Advice    Who Called:  Pt  Best Call Back Number: [phone]  Additional Information: Pt would like to know if PCP can put in a referral to the pain doctor . Pt would like to be contacted when referral is in .  Please Advise ---Thank you

## 2019-09-20 ENCOUNTER — NURSE TRIAGE (OUTPATIENT)
Dept: ADMINISTRATIVE | Facility: CLINIC | Age: 44
End: 2019-09-20

## 2019-09-21 NOTE — TELEPHONE ENCOUNTER
Pt states she has a RX for pain medication but it has , she was not sure if someone could call it in or if she would have to see another Dr. She states she has a pain management appt this coming Tuesday, advised her any uncontrolled pain has to go to ER for provider assessment, caller agreed
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
## 2019-09-23 DIAGNOSIS — M54.2 NECK PAIN: ICD-10-CM

## 2019-09-23 RX ORDER — HYDROCODONE BITARTRATE AND ACETAMINOPHEN 5; 325 MG/1; MG/1
1 TABLET ORAL EVERY 6 HOURS PRN
Qty: 8 TABLET | Refills: 0 | Status: SHIPPED | OUTPATIENT
Start: 2019-09-23 | End: 2019-09-24

## 2019-09-23 NOTE — TELEPHONE ENCOUNTER
----- Message from Shasta Rome sent at 9/23/2019 10:54 AM CDT -----  Contact: Jory  Type: Needs Medical Advice    Who Called:  Patient  Pharmacy name and phone #:    [id number] - [place] [address] [address] [facility] [email or web address]  [address] [place] [id number]  Phone: [phone] Fax: [phone]    Best Call Back Number: [phone] (home)   Additional Information: requesting a call back regarding neck pain--she has an appt tomorrow but she is in a lot of pain & would like to know if we can send in a couple pain pills to hold her until tomorrow--please advise--thank you

## 2019-09-23 NOTE — TELEPHONE ENCOUNTER
Patient wants a refill of norco. Said she is in extreme pain but declines coming in today and not until tomorrow      lov 8/30/19

## 2019-09-24 ENCOUNTER — NURSE TRIAGE (OUTPATIENT)
Dept: ADMINISTRATIVE | Facility: CLINIC | Age: 44
End: 2019-09-24

## 2019-09-24 ENCOUNTER — PATIENT MESSAGE (OUTPATIENT)
Dept: PAIN MEDICINE | Facility: CLINIC | Age: 44
End: 2019-09-24

## 2019-09-24 ENCOUNTER — OFFICE VISIT (OUTPATIENT)
Dept: PAIN MEDICINE | Facility: CLINIC | Age: 44
End: 2019-09-24
Payer: COMMERCIAL

## 2019-09-24 VITALS
TEMPERATURE: 98 F | HEIGHT: 58 IN | DIASTOLIC BLOOD PRESSURE: 67 MMHG | WEIGHT: 153.88 LBS | HEART RATE: 74 BPM | RESPIRATION RATE: 20 BRPM | SYSTOLIC BLOOD PRESSURE: 108 MMHG | BODY MASS INDEX: 32.3 KG/M2

## 2019-09-24 DIAGNOSIS — M54.2 NECK PAIN: Primary | ICD-10-CM

## 2019-09-24 DIAGNOSIS — M79.10 MYALGIA: ICD-10-CM

## 2019-09-24 DIAGNOSIS — S06.0X0A CONCUSSION WITHOUT LOSS OF CONSCIOUSNESS, INITIAL ENCOUNTER: ICD-10-CM

## 2019-09-24 PROCEDURE — 99244 PR OFFICE CONSULTATION,LEVEL IV: ICD-10-PCS | Mod: 25,S$GLB,, | Performed by: ANESTHESIOLOGY

## 2019-09-24 PROCEDURE — 20553 NJX 1/MLT TRIGGER POINTS 3/>: CPT | Mod: S$GLB,,, | Performed by: ANESTHESIOLOGY

## 2019-09-24 PROCEDURE — 20553 PR INJECT TRIGGER POINTS, > 3: ICD-10-PCS | Mod: S$GLB,,, | Performed by: ANESTHESIOLOGY

## 2019-09-24 PROCEDURE — 99999 PR PBB SHADOW E&M-EST. PATIENT-LVL IV: CPT | Mod: PBBFAC,,, | Performed by: ANESTHESIOLOGY

## 2019-09-24 PROCEDURE — 99999 PR PBB SHADOW E&M-EST. PATIENT-LVL IV: ICD-10-PCS | Mod: PBBFAC,,, | Performed by: ANESTHESIOLOGY

## 2019-09-24 PROCEDURE — 99244 OFF/OP CNSLTJ NEW/EST MOD 40: CPT | Mod: 25,S$GLB,, | Performed by: ANESTHESIOLOGY

## 2019-09-24 RX ORDER — HYDROCODONE BITARTRATE AND ACETAMINOPHEN 5; 325 MG/1; MG/1
1 TABLET ORAL
Qty: 7 TABLET | Refills: 0 | Status: SHIPPED | OUTPATIENT
Start: 2019-09-24 | End: 2019-10-01

## 2019-09-24 RX ORDER — METHYLPREDNISOLONE ACETATE 40 MG/ML
40 INJECTION, SUSPENSION INTRA-ARTICULAR; INTRALESIONAL; INTRAMUSCULAR; SOFT TISSUE
Status: COMPLETED | OUTPATIENT
Start: 2019-09-24 | End: 2019-09-24

## 2019-09-24 RX ORDER — METHOCARBAMOL 500 MG/1
500 TABLET, FILM COATED ORAL 2 TIMES DAILY PRN
Qty: 30 TABLET | Refills: 0 | Status: SHIPPED | OUTPATIENT
Start: 2019-09-24 | End: 2019-10-17

## 2019-09-24 RX ADMIN — METHYLPREDNISOLONE ACETATE 40 MG: 40 INJECTION, SUSPENSION INTRA-ARTICULAR; INTRALESIONAL; INTRAMUSCULAR; SOFT TISSUE at 05:09

## 2019-09-24 NOTE — LETTER
September 24, 2019      Flaco Chakraborty, DO  [address] [facility] [address] [place] [address]           [facility]  [address] [place] [address]  Phone: [phone]  Fax: [phone]          Patient: Jory Wheeler   MR Number: [id number]   YOB: 1975   Date of Visit: 9/24/2019       Dear Dr. Flaco Chakraborty:    Thank you for referring Jory Wheeler to me for evaluation. Attached you will find relevant portions of my assessment and plan of care.    If you have questions, please do not hesitate to call me. I look forward to following Jory Wheeler along with you.    Sincerely,    Joaquin Delaney MD    Enclosure  CC:  No Recipients    If you would like to receive this communication electronically, please contact [email or web address] or [phone] to request more information on [facility] access.    For providers and/or their staff who would like to refer a patient to [facility], please contact us through our one-stop-shop provider referral line, [facility], at [phone].    If you feel you have received this communication in error or would no longer like to receive these types of communications, please e-mail [email or web address]

## 2019-09-24 NOTE — TELEPHONE ENCOUNTER
"Pt states she had "trigger point injection" at approx 2 pm today per pain management. Pt c/o worsening pain. Pt advised per protocol to ED now and pt verbalizes understanding.     Reason for Disposition   Weakness of an arm or hand    Additional Information   Negative: Shock suspected (e.g., cold/pale/clammy skin, too weak to stand, low BP, rapid pulse)   Negative: Difficult to awaken or acting confused (e.g., disoriented, slurred speech)   Negative: [1] Similar pain previously AND [2] it was from "heart attack"   Negative: [1] Similar pain previously AND [2] it was from "angina" AND [3] not relieved by nitroglycerin   Negative: Sounds like a life-threatening emergency to the triager   Negative: Difficulty breathing or unusual sweating (e.g., sweating without exertion)   Negative: [1] Stiff neck (can't put chin to chest) AND [2] headache   Negative: [1] Stiff neck (can't put chin to chest) AND [2] fever    Protocols used: NECK PAIN OR [id number]      "
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
## 2019-09-25 ENCOUNTER — PATIENT MESSAGE (OUTPATIENT)
Dept: PAIN MEDICINE | Facility: CLINIC | Age: 44
End: 2019-09-25

## 2019-09-25 ENCOUNTER — TELEPHONE (OUTPATIENT)
Dept: PAIN MEDICINE | Facility: CLINIC | Age: 44
End: 2019-09-25

## 2019-09-25 NOTE — TELEPHONE ENCOUNTER
----- Message from Sudha Tobin sent at 9/24/2019  3:16 PM CDT -----  Type: Needs Medical Advice    Who Called:  patient  Symptoms (please be specific):  na  How long has patient had these symptoms:  dede  Pharmacy name and phone #:  [facility]e  Best Call Back Number: [phone]  Additional Information: patient was seen today and needs to add something to her record that she did not add/asking to speak with nurse/please call

## 2019-09-25 NOTE — TELEPHONE ENCOUNTER
Opioids are best used to treat acute pain not chronic pain, post-surgical pain was an example of that.  I would recommend trying gabapentin 300mg.  Main side effect is drowsiness.  Would start by taking 300mg twice a day for a week, if comfortable with that dose try 300mg three times day.  If can't tolerate it, go back to twice a day.      There is often an overlap between pain and depression, so if you feel your depression may be worsening, I am happy to put in a referral to speak with one of the psychologists here in [place].    Please let me know if you would like to try either or both of these options.

## 2019-09-25 NOTE — TELEPHONE ENCOUNTER
I spoke to Ms. Wheeler.  She called the call-in line last night for worsening pain following TPI.  Today she says her pain has improved.  She denies any weakness, numbness, fever, swelling.  She said arm weakness was listed on the call report b/c the  didn't know what else to put and had to fill in a box.  Discussed her pain should continue to improve.  All questions and concerns addressed.

## 2019-09-26 RX ORDER — GABAPENTIN 300 MG/1
300 CAPSULE ORAL 3 TIMES DAILY
Qty: 90 CAPSULE | Refills: 0 | Status: SHIPPED | OUTPATIENT
Start: 2019-09-26 | End: 2019-10-23 | Stop reason: SDUPTHER

## 2019-09-26 NOTE — TELEPHONE ENCOUNTER
Gabapentin 300mg sent.    Take 1 tablet twice a day for a week.  If no side effects/drowsiness can take 1 tablet three times a day following that.    If too drowsy with 1 tablet twice a day, take 1 tablet only at night.  If still too drowsy should not take.

## 2019-09-26 NOTE — TELEPHONE ENCOUNTER
Some soreness is not unusual following trigger point.  It should continue to ease up.  Gabapentin is an anti-neuropathic medication.  You have tried muscle relaxer and NSAID so far.  Hydrocodone is only for severe pain and not long term solution, so gabapentin offered as adjunctive mediations as a trial to see if that improves your pain.

## 2019-09-27 RX ORDER — ZOLPIDEM TARTRATE 10 MG/1
TABLET ORAL
Qty: 30 TABLET | Refills: 0 | Status: SHIPPED | OUTPATIENT
Start: 2019-09-27 | End: 2019-10-29 | Stop reason: SDUPTHER

## 2019-10-07 DIAGNOSIS — F41.9 ANXIETY: ICD-10-CM

## 2019-10-08 RX ORDER — CLONAZEPAM 1 MG/1
TABLET ORAL
Qty: 60 TABLET | Refills: 0 | Status: SHIPPED | OUTPATIENT
Start: 2019-10-08 | End: 2019-11-07 | Stop reason: SDUPTHER

## 2019-10-09 ENCOUNTER — CLINICAL SUPPORT (OUTPATIENT)
Dept: REHABILITATION | Facility: HOSPITAL | Age: 44
End: 2019-10-09
Attending: ANESTHESIOLOGY
Payer: COMMERCIAL

## 2019-10-09 DIAGNOSIS — M53.82 DECREASED RANGE OF MOTION OF INTERVERTEBRAL DISCS OF CERVICAL SPINE: ICD-10-CM

## 2019-10-09 DIAGNOSIS — M54.2 NECK PAIN, BILATERAL: ICD-10-CM

## 2019-10-09 PROCEDURE — 97161 PT EVAL LOW COMPLEX 20 MIN: CPT | Mod: PO | Performed by: PHYSICAL THERAPIST

## 2019-10-09 RX ORDER — TRAZODONE HYDROCHLORIDE 50 MG/1
TABLET ORAL
Qty: 90 TABLET | Refills: 1 | Status: SHIPPED | OUTPATIENT
Start: 2019-10-09 | End: 2019-12-04 | Stop reason: ALTCHOICE
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
## 2019-10-11 ENCOUNTER — PATIENT MESSAGE (OUTPATIENT)
Dept: FAMILY MEDICINE | Facility: CLINIC | Age: 44
End: 2019-10-11

## 2019-10-14 ENCOUNTER — CLINICAL SUPPORT (OUTPATIENT)
Dept: REHABILITATION | Facility: HOSPITAL | Age: 44
End: 2019-10-14
Attending: ANESTHESIOLOGY
Payer: COMMERCIAL

## 2019-10-14 DIAGNOSIS — M53.82 DECREASED RANGE OF MOTION OF INTERVERTEBRAL DISCS OF CERVICAL SPINE: ICD-10-CM

## 2019-10-14 DIAGNOSIS — M54.2 NECK PAIN, BILATERAL: ICD-10-CM

## 2019-10-14 PROCEDURE — 97110 THERAPEUTIC EXERCISES: CPT | Mod: PO | Performed by: PHYSICAL THERAPIST

## 2019-10-14 PROCEDURE — 97140 MANUAL THERAPY 1/> REGIONS: CPT | Mod: PO | Performed by: PHYSICAL THERAPIST

## 2019-10-14 NOTE — PROGRESS NOTES
[facility] Physical Therapy Treatment      Name: Jory NIETO Liam  Clinic Number: [id number]    Therapy Diagnosis:   Encounter Diagnoses   Name Primary?    Neck pain, bilateral     Decreased range of motion of intervertebral discs of cervical spine      Physician: Joaquin Delaney MD    Visit Date: 10/14/2019  Physician Orders: PT Eval and Treat   Medical Diagnosis from Referral:   Neck pain      Evaluation Date: 10/9/2019  Authorization Period Expiration: 12/31/2019  Plan of Care Expiration: 12/31/2019  Reassessment Due:11/08/2019  Visit # / Visits authorized: 2/ 20     Time In: 1500  Time Out: 1600  Total Billable Time: 38 minutes     Precautions: Standard     Pattern of pain determined: 1 REP    Subjective   Jory reports no new s/s. No adverse effects noted..      Patient reports tolerating previous visit with no adverse effects.  Patient reports their pain to be 3/10 on a 0-10 scale with 0 being no pain and 10 being the worst pain imaginable.  Pain Location: bilateral low cervical region     Work and leisure: Family time  Pt goals: Decrease pain to neck. Increase mobility for the neck     Objective   Baseline Isometric Testing on Med X equipment:  Testing administered by PT  Date of testing: 10/09/2019  ROM 12-90 deg   Max Peak Torque 193    Min Peak Torque 73    Flex/Ext Ratio 2.6/1   % below normative data 37     Outcomes:  Initial score:   CMS Impairment/Limitation/Restriction for FOTO Neck Survey  Status Limitation G-Code CMS Severity Modifier  Intake 55% 45% Current Status CK - At least 40 percent but less than 60 percent  Predicted 68% 32% Goal Status+ CJ - At least 20 percent but less than 40 percent    Visit 5 score:  Goal:    Treatment    Pt was instructed in and performed the following:   Previous:  REPEATED TEST MOVEMENTS:   Repeated Protraction in Sitting end range pain   Repeated Flexion in Sitting end range pain   Repeated Retraction in Sitting  no effect   Repeated Retraction Extension in  "Sitting no worse        Jory received therapeutic exercises to develop/improved posture, cardiovascular endurance, muscular endurance, lumbar/cervical ROM, strength and muscular endurance for 40 minutes including the following exercises:     Seated: cervical retraction 1/30", f/b 1/10  Supine: scalene stretch 3/30" (anterior/middle)    Peripheral muscle strengthening which included 1 set of 15-20 repetitions at a slow, controlled 10-13 second per rep pace focused on strengthening supporting musculature for improved body mechanics and functional mobility.  Pt and therapist focused on proper form during treatment to ensure optimal strengthening of each targeted muscle group.  Machines were utilized including torso rotation, leg extension, leg curl, chest press, upright row. Tricep extension, bicep curl, leg press, and hip abduction added visit 3    Jory received the following manual therapy techniques: Soft tissue Mobilization were applied to the: cervical/thoracic region for 8 minutes for mobility purposes.    [facility] 10/14/2019   Visit Number 2   VAS Pain Rating 3   Retraction in Sitting 10   Scapular Retraction 10   Cervical Extension Seat Pad -   Seat Adjustment -   Top Dead Center -   Counterweight -   Cervical Flexion -   Cervical Extension -   Cervical Peak Torque -   Cervical Weight 153   Repetitions 20   Rating of Perceived Exertion 4       Home Exercises Provided and Patient Education Provided     Education provided:   - Yes    Written Home Exercises Provided: Patient instructed to cont prior HEP.  Exercises were reviewed and JORY was able to demonstrate them prior to the end of the session.  JORY demonstrated good  understanding of the education provided.     See EMR under Patient Instructions for exercises provided prior visit.    Assessment   Good tolerance with Med-X extensor strengthening. No adverse effects. Cueing on cervical retraction in seated position.    Patient is making good " progress towards established goals.  Pt will continue to benefit from skilled outpatient physical therapy to address the deficits stated in the impairment chart, provide pt/family education and to maximize pt's level of independence in the home and community environment.     Anticipated Barriers for therapy: none  Pt's spiritual, cultural and educational needs considered and pt agreeable to plan of care and goals as stated below:     Goals:   Short term goals: 6 weeks or 10 visits   1.  Pt will demonstratte increased cervical ROM as measured by med ex by 5 degrees from initial test which results in improved  ROM of neck for ease with ADLs and driving  2. Pt will demonstrate independence with reducing or controlling symptoms with ther ex, movement, or position independently, able to reduce pain 1-2 points on pain scale using strategies taught in therapy  3. Pt will demonstrate increased maximum isometric torque value by 5% when compared to the initial value resulting in improved ability to perform bending, lifting, and carrying activities safely, confidently.     Long term goals: 13 weeks or 20 visits  1. Pt will demonstratte increased cervical ROM as measured by med ex by 5-8 degrees from initial test which results in functional ROM of neck for ease with ADLs and driving  2. Pt will demonstrate increased isometric torque by 10% from initial test to improve ability to lift and carry, and sustain good posture while performing ADL's  3.Pt will demonstrate reduced pain and improved functional outcomes as reported on the FOTO by reaching a score of CJ = at least 20% but < 40% impaired, limited or restricted or less in order to demonstrate subjective improvement in pt's condition.    4. Pt will demonstrate independence with reducing or controlling symptoms with ther ex, movement, or position independently, able to reduce pain 2-4 points on pain scale using strategies taught in therapy  5. Pt will demonstrate independence  with the HEP at discharge.   6.  Demonstrate left cervical rotation > 60 degrees for driving purposes with no painful limitations.       Plan   Continue with established Plan of Care towards established PT goals.

## 2019-10-15 DIAGNOSIS — F32.1 CURRENT MODERATE EPISODE OF MAJOR DEPRESSIVE DISORDER, UNSPECIFIED WHETHER RECURRENT: ICD-10-CM

## 2019-10-15 RX ORDER — CITALOPRAM 40 MG/1
TABLET, FILM COATED ORAL
Qty: 90 TABLET | Refills: 1 | Status: SHIPPED | OUTPATIENT
Start: 2019-10-15 | End: 2020-04-13

## 2019-10-17 ENCOUNTER — OFFICE VISIT (OUTPATIENT)
Dept: FAMILY MEDICINE | Facility: CLINIC | Age: 44
End: 2019-10-17
Payer: COMMERCIAL

## 2019-10-17 VITALS
OXYGEN SATURATION: 97 % | WEIGHT: 151.25 LBS | SYSTOLIC BLOOD PRESSURE: 120 MMHG | HEART RATE: 97 BPM | HEIGHT: 58 IN | DIASTOLIC BLOOD PRESSURE: 85 MMHG | BODY MASS INDEX: 31.75 KG/M2

## 2019-10-17 DIAGNOSIS — G43.909 MIGRAINE WITHOUT STATUS MIGRAINOSUS, NOT INTRACTABLE, UNSPECIFIED MIGRAINE TYPE: Primary | ICD-10-CM

## 2019-10-17 DIAGNOSIS — F41.9 ANXIETY: ICD-10-CM

## 2019-10-17 PROCEDURE — 99999 PR PBB SHADOW E&M-EST. PATIENT-LVL III: ICD-10-PCS | Mod: PBBFAC,,, | Performed by: INTERNAL MEDICINE

## 2019-10-17 PROCEDURE — 99214 OFFICE O/P EST MOD 30 MIN: CPT | Mod: S$GLB,,, | Performed by: INTERNAL MEDICINE

## 2019-10-17 PROCEDURE — 3008F PR BODY MASS INDEX (BMI) DOCUMENTED: ICD-10-PCS | Mod: CPTII,S$GLB,, | Performed by: INTERNAL MEDICINE

## 2019-10-17 PROCEDURE — 99999 PR PBB SHADOW E&M-EST. PATIENT-LVL III: CPT | Mod: PBBFAC,,, | Performed by: INTERNAL MEDICINE

## 2019-10-17 PROCEDURE — 99214 PR OFFICE/OUTPT VISIT, EST, LEVL IV, 30-39 MIN: ICD-10-PCS | Mod: S$GLB,,, | Performed by: INTERNAL MEDICINE

## 2019-10-17 PROCEDURE — 3008F BODY MASS INDEX DOCD: CPT | Mod: CPTII,S$GLB,, | Performed by: INTERNAL MEDICINE

## 2019-10-17 RX ORDER — HYDROXYZINE HYDROCHLORIDE 25 MG/1
TABLET, FILM COATED ORAL
Refills: 0 | COMMUNITY
Start: 2019-09-27 | End: 2020-09-08 | Stop reason: SDUPTHER

## 2019-10-17 RX ORDER — SUMATRIPTAN 50 MG/1
TABLET, FILM COATED ORAL
Qty: 4 TABLET | Refills: 0 | Status: SHIPPED | OUTPATIENT
Start: 2019-10-17 | End: 2019-11-05 | Stop reason: SDUPTHER
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
## 2019-10-23 ENCOUNTER — TELEPHONE (OUTPATIENT)
Dept: FAMILY MEDICINE | Facility: CLINIC | Age: 44
End: 2019-10-23

## 2019-10-23 ENCOUNTER — CLINICAL SUPPORT (OUTPATIENT)
Dept: REHABILITATION | Facility: HOSPITAL | Age: 44
End: 2019-10-23
Attending: ANESTHESIOLOGY
Payer: COMMERCIAL

## 2019-10-23 DIAGNOSIS — M53.82 DECREASED RANGE OF MOTION OF INTERVERTEBRAL DISCS OF CERVICAL SPINE: ICD-10-CM

## 2019-10-23 DIAGNOSIS — M54.2 NECK PAIN, BILATERAL: ICD-10-CM

## 2019-10-23 PROCEDURE — 97110 THERAPEUTIC EXERCISES: CPT | Mod: PO | Performed by: PHYSICAL THERAPIST

## 2019-10-23 RX ORDER — GABAPENTIN 300 MG/1
300 CAPSULE ORAL 3 TIMES DAILY
Qty: 90 CAPSULE | Refills: 0 | Status: SHIPPED | OUTPATIENT
Start: 2019-10-23 | End: 2019-11-21 | Stop reason: SDUPTHER

## 2019-10-23 NOTE — PROGRESS NOTES
[facility]      Name: Jory Vicentewain  Clinic Number: [id number]    Therapy Diagnosis:   Encounter Diagnoses   Name Primary?    Neck pain, bilateral     Decreased range of motion of intervertebral discs of cervical spine      Physician: Joaquin Delaney MD    Visit Date: 10/23/2019  Physician Orders: PT Eval and Treat   Medical Diagnosis from Referral:   Neck pain      Evaluation Date: 10/9/2019  Authorization Period Expiration: 12/31/2019  Plan of Care Expiration: 12/31/2019  Reassessment Due:11/08/2019  Visit # / Visits authorized: 3/ 20     Time In: 1430  Time Out: 1530  Total Billable Time: 40 minutes     Precautions: Standard     Pattern of pain determined: 1 REP    Subjective   Jory reports no new s/s and tends to have more symptoms towards the end of the week. Localized right sided neck pain. intermittent    Patient reports tolerating previous visit with no adverse effects.  Patient reports their pain to be 2/10 on a 0-10 scale with 0 being no pain and 10 being the worst pain imaginable.  Pain Location: bilateral low cervical region     Work and leisure: Family time  Pt goals: Decrease pain to neck. Increase mobility for the neck     Objective   Baseline Isometric Testing on Med X equipment:  Testing administered by PT  Date of testing: 10/09/2019  ROM 12-90 deg   Max Peak Torque 193    Min Peak Torque 73    Flex/Ext Ratio 2.6/1   % below normative data 37     Outcomes:  Initial score:   CMS Impairment/Limitation/Restriction for FOTO Neck Survey  Status Limitation G-Code CMS Severity Modifier  Intake 55% 45% Current Status CK - At least 40 percent but less than 60 percent  Predicted 68% 32% Goal Status+ CJ - At least 20 percent but less than 40 percent    Visit 5 score:  Goal:    Treatment    Pt was instructed in and performed the following:   Previous:  REPEATED TEST MOVEMENTS:   Repeated Protraction in Sitting end range pain   Repeated Flexion in Sitting end range pain  "  Repeated Retraction in Sitting  no effect   Repeated Retraction Extension in Sitting no worse        Jory received therapeutic exercises to develop/improved posture, cardiovascular endurance, muscular endurance, lumbar/cervical ROM, strength and muscular endurance for 40 minutes including the following exercises:   UBE  Seated: cervical retraction 1/30", f/b 1/10  Supine: scalene stretch 3/30" (anterior/middle)    Peripheral muscle strengthening which included 1 set of 15-20 repetitions at a slow, controlled 10-13 second per rep pace focused on strengthening supporting musculature for improved body mechanics and functional mobility.  Pt and therapist focused on proper form during treatment to ensure optimal strengthening of each targeted muscle group.  Machines were utilized including torso rotation, leg extension, leg curl, chest press, upright row. Tricep extension, bicep curl, leg press, and hip abduction added visit 3    [facility] 10/23/2019   Visit Number 3   VAS Pain Rating 2   Retraction in Sitting 10   Scapular Retraction 10   Cervical Extension Seat Pad -   Seat Adjustment -   Top Dead Center -   Counterweight -   Cervical Flexion -   Cervical Extension -   Cervical Peak Torque -   Cervical Weight 159   Repetitions 20   Rating of Perceived Exertion 5         Jory received the following manual therapy techniques: Soft tissue Mobilization were applied to the: cervical/thoracic region for 5 minutes for mobility purposes.  Supine: upper cervical flexion with retraction , clinician over-pressure    Home Exercises Provided and Patient Education Provided     Education provided:   - Yes    Written Home Exercises Provided: Patient instructed to cont prior HEP.  Exercises were reviewed and JORY was able to demonstrate them prior to the end of the session.  JORY demonstrated good  understanding of the education provided.     See EMR under Patient Instructions for exercises provided prior " visit.    Assessment   Good tolerance with TE progression. Patient noted with sub-occipital muscle shortening and responded well with manual cervical retraction/flexion over-pressure. No neck pain in supine position.    Patient is making good progress towards established goals.  Pt will continue to benefit from skilled outpatient physical therapy to address the deficits stated in the impairment chart, provide pt/family education and to maximize pt's level of independence in the home and community environment.     Anticipated Barriers for therapy: none  Pt's spiritual, cultural and educational needs considered and pt agreeable to plan of care and goals as stated below:     Goals:   Short term goals: 6 weeks or 10 visits   1.  Pt will demonstratte increased cervical ROM as measured by med ex by 5 degrees from initial test which results in improved  ROM of neck for ease with ADLs and driving  2. Pt will demonstrate independence with reducing or controlling symptoms with ther ex, movement, or position independently, able to reduce pain 1-2 points on pain scale using strategies taught in therapy  3. Pt will demonstrate increased maximum isometric torque value by 5% when compared to the initial value resulting in improved ability to perform bending, lifting, and carrying activities safely, confidently.     Long term goals: 13 weeks or 20 visits  1. Pt will demonstratte increased cervical ROM as measured by med ex by 5-8 degrees from initial test which results in functional ROM of neck for ease with ADLs and driving  2. Pt will demonstrate increased isometric torque by 10% from initial test to improve ability to lift and carry, and sustain good posture while performing ADL's  3.Pt will demonstrate reduced pain and improved functional outcomes as reported on the FOTO by reaching a score of CJ = at least 20% but < 40% impaired, limited or restricted or less in order to demonstrate subjective improvement in pt's condition.     4. Pt will demonstrate independence with reducing or controlling symptoms with ther ex, movement, or position independently, able to reduce pain 2-4 points on pain scale using strategies taught in therapy  5. Pt will demonstrate independence with the HEP at discharge.   6.  Demonstrate left cervical rotation > 60 degrees for driving purposes with no painful limitations.       Plan   Continue with established Plan of Care towards established PT goals.

## 2019-10-29 RX ORDER — ZOLPIDEM TARTRATE 10 MG/1
TABLET ORAL
Qty: 30 TABLET | Refills: 0 | Status: SHIPPED | OUTPATIENT
Start: 2019-10-29 | End: 2019-11-25 | Stop reason: SDUPTHER

## 2019-10-30 ENCOUNTER — CLINICAL SUPPORT (OUTPATIENT)
Dept: REHABILITATION | Facility: HOSPITAL | Age: 44
End: 2019-10-30
Attending: ANESTHESIOLOGY
Payer: COMMERCIAL

## 2019-10-30 DIAGNOSIS — M53.82 DECREASED RANGE OF MOTION OF INTERVERTEBRAL DISCS OF CERVICAL SPINE: ICD-10-CM

## 2019-10-30 DIAGNOSIS — M54.2 NECK PAIN, BILATERAL: ICD-10-CM

## 2019-10-30 PROCEDURE — 97110 THERAPEUTIC EXERCISES: CPT | Mod: PO | Performed by: PHYSICAL THERAPIST

## 2019-10-30 NOTE — PROGRESS NOTES
[facility]      Name: Jory Vicentewain  Clinic Number: [id number]    Therapy Diagnosis:   Encounter Diagnoses   Name Primary?    Neck pain, bilateral     Decreased range of motion of intervertebral discs of cervical spine      Physician: Joaquin Delaney MD    Visit Date: 10/30/2019  Physician Orders: PT Eval and Treat   Medical Diagnosis from Referral:   Neck pain      Evaluation Date: 10/9/2019  Authorization Period Expiration: 12/31/2019  Plan of Care Expiration: 12/31/2019  Reassessment Due:11/08/2019  Visit # / Visits authorized: 4/ 20     Time In: 1010  Time Out: 1100  Total Billable Time: 45 minutes     Precautions: Standard     Pattern of pain determined: 1 REP    Subjective   Jory reports having no neck pain. HEP has helped..    Patient reports tolerating previous visit with no adverse effects.  Patient reports their pain to be 2/10 on a 0-10 scale with 0 being no pain and 10 being the worst pain imaginable.  Pain Location: bilateral low cervical region     Work and leisure: Family time  Pt goals: Decrease pain to neck. Increase mobility for the neck     Objective   Baseline Isometric Testing on Med X equipment:  Testing administered by PT  Date of testing: 10/09/2019  ROM 12-90 deg   Max Peak Torque 193    Min Peak Torque 73    Flex/Ext Ratio 2.6/1   % below normative data 37     Outcomes:  Initial score:   CMS Impairment/Limitation/Restriction for FOTO Neck Survey  Status Limitation G-Code CMS Severity Modifier  Intake 55% 45% Current Status CK - At least 40 percent but less than 60 percent  Predicted 68% 32% Goal Status+ CJ - At least 20 percent but less than 40 percent    Visit 5 score:  Goal:    Treatment    Pt was instructed in and performed the following:   Previous:  REPEATED TEST MOVEMENTS:   Repeated Protraction in Sitting end range pain   Repeated Flexion in Sitting end range pain   Repeated Retraction in Sitting  no effect   Repeated Retraction Extension in  "Sitting no worse        Jory received therapeutic exercises to develop/improved posture, cardiovascular endurance, muscular endurance, lumbar/cervical ROM, strength and muscular endurance for 40 minutes including the following exercises:   UBE  Seated: cervical retraction 1/30", f/b 1/10 3"  Supine: scalene stretch 3/30" (anterior/middle)    Peripheral muscle strengthening which included 1 set of 15-20 repetitions at a slow, controlled 10-13 second per rep pace focused on strengthening supporting musculature for improved body mechanics and functional mobility.  Pt and therapist focused on proper form during treatment to ensure optimal strengthening of each targeted muscle group.  Machines were utilized including torso rotation, leg extension, leg curl, chest press, upright row. Tricep extension, bicep curl, leg press, and hip abduction added visit 3    [facility] 10/30/2019   Visit Number 4   VAS Pain Rating 0   Retraction in Sitting 10   Scapular Retraction 10   Cervical Extension Seat Pad -   Seat Adjustment -   Top Dead Center -   Counterweight -   Cervical Flexion -   Cervical Extension -   Cervical Peak Torque -   Cervical Weight 159   Repetitions 20   Rating of Perceived Exertion 5       Jory received the following manual therapy techniques: Soft tissue Mobilization were applied to the: cervical/thoracic region for 5 minutes for mobility purposes.  Supine: upper cervical flexion with retraction , clinician over-pressure    Home Exercises Provided and Patient Education Provided     Education provided:   - Yes    Written Home Exercises Provided: Patient instructed to cont prior HEP.  Exercises were reviewed and JOYR was able to demonstrate them prior to the end of the session.  JORY demonstrated good  understanding of the education provided.     See EMR under Patient Instructions for exercises provided prior visit.    Assessment   Good tolerance with TE progression. Cueing on posture. Cervical " retraction, decreased, better afterwards. Cervical left side-bending noted with scapular discomfort. No adverse effects.    Patient is making good progress towards established goals.  Pt will continue to benefit from skilled outpatient physical therapy to address the deficits stated in the impairment chart, provide pt/family education and to maximize pt's level of independence in the home and community environment.     Anticipated Barriers for therapy: none  Pt's spiritual, cultural and educational needs considered and pt agreeable to plan of care and goals as stated below:     Goals:   Short term goals: 6 weeks or 10 visits   1.  Pt will demonstratte increased cervical ROM as measured by med ex by 5 degrees from initial test which results in improved  ROM of neck for ease with ADLs and driving  2. Pt will demonstrate independence with reducing or controlling symptoms with ther ex, movement, or position independently, able to reduce pain 1-2 points on pain scale using strategies taught in therapy  3. Pt will demonstrate increased maximum isometric torque value by 5% when compared to the initial value resulting in improved ability to perform bending, lifting, and carrying activities safely, confidently.     Long term goals: 13 weeks or 20 visits  1. Pt will demonstratte increased cervical ROM as measured by med ex by 5-8 degrees from initial test which results in functional ROM of neck for ease with ADLs and driving  2. Pt will demonstrate increased isometric torque by 10% from initial test to improve ability to lift and carry, and sustain good posture while performing ADL's  3.Pt will demonstrate reduced pain and improved functional outcomes as reported on the FOTO by reaching a score of CJ = at least 20% but < 40% impaired, limited or restricted or less in order to demonstrate subjective improvement in pt's condition.    4. Pt will demonstrate independence with reducing or controlling symptoms with ther ex,  movement, or position independently, able to reduce pain 2-4 points on pain scale using strategies taught in therapy  5. Pt will demonstrate independence with the HEP at discharge.   6.  Demonstrate left cervical rotation > 60 degrees for driving purposes with no painful limitations.       Plan   Continue with established Plan of Care towards established PT goals.

## 2019-11-01 ENCOUNTER — TELEPHONE (OUTPATIENT)
Dept: FAMILY MEDICINE | Facility: CLINIC | Age: 44
End: 2019-11-01

## 2019-11-01 NOTE — TELEPHONE ENCOUNTER
Spoke with patient and advised recommendation. She will call back to schedule. She advised that the sumatriptan did help some but not completely.
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
## 2019-11-01 NOTE — TELEPHONE ENCOUNTER
----- Message from Paulette Foster sent at 11/1/2019 10:20 AM CDT -----  Contact: Patient  Type: Needs Medical Advice    Who Called:  patient  Symptoms (please be specific):  na  How long has patient had these symptoms:  dede  Pharmacy name and phone #:  dede  Best Call Back Number: [phone]  Additional Information: Patient states neck,head and ear pain on left side,patient does not know who can see her or .  Please call to advise. Thanks!
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
## 2019-11-02 ENCOUNTER — PATIENT MESSAGE (OUTPATIENT)
Dept: PAIN MEDICINE | Facility: CLINIC | Age: 44
End: 2019-11-02

## 2019-11-04 ENCOUNTER — CLINICAL SUPPORT (OUTPATIENT)
Dept: REHABILITATION | Facility: HOSPITAL | Age: 44
End: 2019-11-04
Attending: ANESTHESIOLOGY
Payer: COMMERCIAL

## 2019-11-04 ENCOUNTER — PATIENT MESSAGE (OUTPATIENT)
Dept: PAIN MEDICINE | Facility: CLINIC | Age: 44
End: 2019-11-04

## 2019-11-04 DIAGNOSIS — M54.2 NECK PAIN, BILATERAL: ICD-10-CM

## 2019-11-04 DIAGNOSIS — M53.82 DECREASED RANGE OF MOTION OF INTERVERTEBRAL DISCS OF CERVICAL SPINE: ICD-10-CM

## 2019-11-04 PROCEDURE — 97110 THERAPEUTIC EXERCISES: CPT | Mod: PO | Performed by: PHYSICAL THERAPIST

## 2019-11-04 NOTE — PROGRESS NOTES
[facility]      Name: Jory Wheeler  Clinic Number: [id number]    Therapy Diagnosis:   Encounter Diagnoses   Name Primary?    Neck pain, bilateral     Decreased range of motion of intervertebral discs of cervical spine      Physician: Joaquin Delaney MD    Visit Date: 11/4/2019  Physician Orders: PT Eval and Treat   Medical Diagnosis from Referral:   Neck pain      Evaluation Date: 10/9/2019  Authorization Period Expiration: 12/31/2019  Plan of Care Expiration: 12/31/2019  Reassessment Due:11/08/2019  Visit # / Visits authorized: 5/ 20     Time In: 1500  Time Out: 1550  Total Billable Time: 40 minutes     Precautions: Standard     Pattern of pain determined: 1 REP    Subjective   Jory reports having left side neck pain, intermittent , over the weekend that was noticed with looking down at phone, mopping, sweeping as well.    Patient reports tolerating previous visit with no adverse effects.  Patient reports their pain to be 2/10 on a 0-10 scale with 0 being no pain and 10 being the worst pain imaginable.  Pain Location: bilateral low cervical region     Work and leisure: Family time  Pt goals: Decrease pain to neck. Increase mobility for the neck     Objective   Baseline Isometric Testing on Med X equipment:  Testing administered by PT  Date of testing: 10/09/2019  ROM 12-90 deg   Max Peak Torque 193    Min Peak Torque 73    Flex/Ext Ratio 2.6/1   % below normative data 37     Outcomes:  Initial score:   CMS Impairment/Limitation/Restriction for FOTO Neck Survey  Status Limitation G-Code CMS Severity Modifier  Intake 55% 45% Current Status CK - At least 40 percent but less than 60 percent  Predicted 68% 32% Goal Status+ CJ - At least 20 percent but less than 40 percent    Visit 5 score:  Goal:    Treatment    Pt was instructed in and performed the following:   Previous:  REPEATED TEST MOVEMENTS:   Repeated Protraction in Sitting end range pain   Repeated Flexion in Sitting end  "range pain   Repeated Retraction in Sitting  no effect   Repeated Retraction Extension in Sitting no worse        Cervical rotation: 54 degrees actively, passively 64 degrees    Jory received therapeutic exercises to develop/improved posture, cardiovascular endurance, muscular endurance, lumbar/cervical ROM, strength and muscular endurance for 40 minutes including the following exercises:   UBE  Seated: cervical retraction 1/30", f/b 1/10 3", progressed to extension as well  Supine: scalene stretch 3/30" (anterior/middle)    Peripheral muscle strengthening which included 1 set of 15-20 repetitions at a slow, controlled 10-13 second per rep pace focused on strengthening supporting musculature for improved body mechanics and functional mobility.  Pt and therapist focused on proper form during treatment to ensure optimal strengthening of each targeted muscle group.  Machines were utilized including torso rotation, leg extension, leg curl, chest press, upright row. Tricep extension, bicep curl, leg press, and hip abduction added visit 3    [facility] 11/4/2019   Visit Number 5   VAS Pain Rating -   Retraction in Sitting 10   Scapular Retraction -   Cervical Extension Seat Pad -   Seat Adjustment -   Top Dead Center -   Counterweight -   Cervical Flexion -   Cervical Extension -   Cervical Peak Torque -   Cervical Weight 159   Repetitions 20   Rating of Perceived Exertion 4     Jory received the following manual therapy techniques: Soft tissue Mobilization were applied to the: cervical/thoracic region for 5 minutes for mobility purposes.  Supine: upper cervical flexion with retraction , clinician over-pressure    Home Exercises Provided and Patient Education Provided     Education provided:   - Yes    Written Home Exercises Provided: Patient instructed to cont prior HEP.  Exercises were reviewed and JORY was able to demonstrate them prior to the end of the session.  JORY demonstrated good  understanding of " the education provided.     See EMR under Patient Instructions for exercises provided prior visit.    Assessment   Improvement with cervical rotation to the left side. Cervical retraction: decreased left sided neck pain, better afterwards. No adverse effects.    Patient is making good progress towards established goals.  Pt will continue to benefit from skilled outpatient physical therapy to address the deficits stated in the impairment chart, provide pt/family education and to maximize pt's level of independence in the home and community environment.     Anticipated Barriers for therapy: none  Pt's spiritual, cultural and educational needs considered and pt agreeable to plan of care and goals as stated below:     Goals:   Short term goals: 6 weeks or 10 visits   1.  Pt will demonstratte increased cervical ROM as measured by med ex by 5 degrees from initial test which results in improved  ROM of neck for ease with ADLs and driving  2. Pt will demonstrate independence with reducing or controlling symptoms with ther ex, movement, or position independently, able to reduce pain 1-2 points on pain scale using strategies taught in therapy  3. Pt will demonstrate increased maximum isometric torque value by 5% when compared to the initial value resulting in improved ability to perform bending, lifting, and carrying activities safely, confidently.     Long term goals: 13 weeks or 20 visits  1. Pt will demonstratte increased cervical ROM as measured by med ex by 5-8 degrees from initial test which results in functional ROM of neck for ease with ADLs and driving  2. Pt will demonstrate increased isometric torque by 10% from initial test to improve ability to lift and carry, and sustain good posture while performing ADL's  3.Pt will demonstrate reduced pain and improved functional outcomes as reported on the FOTO by reaching a score of CJ = at least 20% but < 40% impaired, limited or restricted or less in order to demonstrate  subjective improvement in pt's condition.    4. Pt will demonstrate independence with reducing or controlling symptoms with ther ex, movement, or position independently, able to reduce pain 2-4 points on pain scale using strategies taught in therapy  5. Pt will demonstrate independence with the HEP at discharge.   6.  Demonstrate left cervical rotation > 60 degrees for driving purposes with no painful limitations.       Plan   Continue with established Plan of Care towards established PT goals.

## 2019-11-05 ENCOUNTER — PATIENT MESSAGE (OUTPATIENT)
Dept: PAIN MEDICINE | Facility: CLINIC | Age: 44
End: 2019-11-05

## 2019-11-05 DIAGNOSIS — G43.909 MIGRAINE WITHOUT STATUS MIGRAINOSUS, NOT INTRACTABLE, UNSPECIFIED MIGRAINE TYPE: ICD-10-CM
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
## 2019-11-06 ENCOUNTER — PATIENT MESSAGE (OUTPATIENT)
Dept: PAIN MEDICINE | Facility: CLINIC | Age: 44
End: 2019-11-06

## 2019-11-06 RX ORDER — CLOTRIMAZOLE AND BETAMETHASONE DIPROPIONATE 10; .64 MG/G; MG/G
CREAM TOPICAL
Qty: 45 G | Refills: 0 | OUTPATIENT
Start: 2019-11-06

## 2019-11-06 RX ORDER — CLOTRIMAZOLE AND BETAMETHASONE DIPROPIONATE 10; .64 MG/G; MG/G
CREAM TOPICAL 2 TIMES DAILY
Qty: 45 G | Refills: 0 | Status: SHIPPED | OUTPATIENT
Start: 2019-11-06 | End: 2020-09-15 | Stop reason: SDUPTHER

## 2019-11-06 RX ORDER — SUMATRIPTAN 50 MG/1
TABLET, FILM COATED ORAL
Qty: 8 TABLET | Refills: 0 | Status: SHIPPED | OUTPATIENT
Start: 2019-11-06 | End: 2019-12-04

## 2019-11-06 NOTE — PROGRESS NOTES
Refill Routing Note    Medication(s) are appropriate for refill Outside of protocol  Medication(s) are on the active medication list Yes  Refills and Dispense Quantity have been updated NO      Requested Prescriptions   Pending Prescriptions Disp Refills    clotrimazole-betamethasone 1-0.05% (LOTRISONE) cream [Pharmacy Med Name: CLOTRIMAZOLE-BETAMETHASONE CRM 15GM] 45 g 0     Sig: APPLY EXTERNALLY TO THE AFFECTED AREA TWICE DAILY       Off-Protocol Failed - 11/6/2019 11:21 AM        Failed - Medication not assigned to a protocol, review manually.        Passed - Office visit in past 12 months or future 90 days     Recent Outpatient Visits            2 weeks ago Migraine without status migrainosus, not intractable, unspecified migraine type    [facility] Flaco Chakraborty DO    1 month ago Neck pain    [facility] Joaquin Delaney MD    2 months ago Neck pain    [facility] Flaco Chakraborty DO    3 months ago Insomnia, unspecified type    [facility] Flaco Chakraborty DO    8 months ago Insomnia, unspecified type    [facility] JAYLEEN Rowley MD          Future Appointments              In 2 days Leonard Findley, PT [facility], [place]    In 1 week Leonard Findley, PT [facility], [place]    In 2 months Flaco Chakraborty DO [facility]

## 2019-11-07 ENCOUNTER — PATIENT MESSAGE (OUTPATIENT)
Dept: PAIN MEDICINE | Facility: CLINIC | Age: 44
End: 2019-11-07

## 2019-11-07 DIAGNOSIS — F41.9 ANXIETY: ICD-10-CM

## 2019-11-08 RX ORDER — CLONAZEPAM 1 MG/1
TABLET ORAL
Qty: 60 TABLET | Refills: 0 | OUTPATIENT
Start: 2019-11-08

## 2019-11-08 RX ORDER — CLONAZEPAM 1 MG/1
1 TABLET ORAL 2 TIMES DAILY PRN
Qty: 60 TABLET | Refills: 0 | Status: SHIPPED | OUTPATIENT
Start: 2019-11-08 | End: 2019-12-07 | Stop reason: SDUPTHER

## 2019-11-08 RX ORDER — METHOCARBAMOL 500 MG/1
500 TABLET, FILM COATED ORAL 2 TIMES DAILY PRN
Qty: 30 TABLET | Refills: 0 | Status: SHIPPED | OUTPATIENT
Start: 2019-11-08 | End: 2019-12-04

## 2019-11-08 NOTE — PROGRESS NOTES
Refill Routing Note    Medication(s) are appropriate for refill Outside of protocol  Medication(s) are on the active medication list Yes  Refills and Dispense Quantity have been updated NO      Requested Prescriptions   Pending Prescriptions Disp Refills    clonazePAM (KLONOPIN) 1 MG tablet 60 tablet 0       Anticonvulsants Protocol Passed - 11/7/2019  5:01 PM        Passed - Visit with Authorizing provider in past 9 months or upcoming 90 days        Passed - No active pregnancy on record

## 2019-11-15 ENCOUNTER — TELEPHONE (OUTPATIENT)
Dept: PHYSICAL MEDICINE AND REHAB | Facility: CLINIC | Age: 44
End: 2019-11-15

## 2019-11-15 ENCOUNTER — OFFICE VISIT (OUTPATIENT)
Dept: PHYSICAL MEDICINE AND REHAB | Facility: CLINIC | Age: 44
End: 2019-11-15
Payer: COMMERCIAL

## 2019-11-15 VITALS
DIASTOLIC BLOOD PRESSURE: 99 MMHG | BODY MASS INDEX: 31.7 KG/M2 | WEIGHT: 151 LBS | HEART RATE: 106 BPM | SYSTOLIC BLOOD PRESSURE: 144 MMHG | HEIGHT: 58 IN

## 2019-11-15 DIAGNOSIS — G44.329 CHRONIC POST-TRAUMATIC HEADACHE, NOT INTRACTABLE: ICD-10-CM

## 2019-11-15 DIAGNOSIS — S09.90XA HEAD TRAUMA, INITIAL ENCOUNTER: ICD-10-CM

## 2019-11-15 DIAGNOSIS — M54.2 CERVICALGIA: ICD-10-CM

## 2019-11-15 DIAGNOSIS — S06.9X0A MILD TRAUMATIC BRAIN INJURY, WITHOUT LOSS OF CONSCIOUSNESS, INITIAL ENCOUNTER: Primary | ICD-10-CM

## 2019-11-15 DIAGNOSIS — S13.4XXA WHIPLASH INJURY TO NECK, INITIAL ENCOUNTER: ICD-10-CM

## 2019-11-15 DIAGNOSIS — M50.30 ANNULAR TEAR OF CERVICAL DISC: ICD-10-CM

## 2019-11-15 DIAGNOSIS — R41.840 DISTURBED CONCENTRATION: ICD-10-CM

## 2019-11-15 PROCEDURE — 3008F PR BODY MASS INDEX (BMI) DOCUMENTED: ICD-10-PCS | Mod: CPTII,S$GLB,, | Performed by: PHYSICAL MEDICINE & REHABILITATION

## 2019-11-15 PROCEDURE — 99205 OFFICE O/P NEW HI 60 MIN: CPT | Mod: S$GLB,,, | Performed by: PHYSICAL MEDICINE & REHABILITATION

## 2019-11-15 PROCEDURE — 99999 PR PBB SHADOW E&M-EST. PATIENT-LVL III: ICD-10-PCS | Mod: PBBFAC,,, | Performed by: PHYSICAL MEDICINE & REHABILITATION

## 2019-11-15 PROCEDURE — 99999 PR PBB SHADOW E&M-EST. PATIENT-LVL III: CPT | Mod: PBBFAC,,, | Performed by: PHYSICAL MEDICINE & REHABILITATION

## 2019-11-15 PROCEDURE — 99205 PR OFFICE/OUTPT VISIT, NEW, LEVL V, 60-74 MIN: ICD-10-PCS | Mod: S$GLB,,, | Performed by: PHYSICAL MEDICINE & REHABILITATION

## 2019-11-15 PROCEDURE — 3008F BODY MASS INDEX DOCD: CPT | Mod: CPTII,S$GLB,, | Performed by: PHYSICAL MEDICINE & REHABILITATION

## 2019-11-15 RX ORDER — AMITRIPTYLINE HYDROCHLORIDE 10 MG/1
10 TABLET, FILM COATED ORAL NIGHTLY PRN
Qty: 30 TABLET | Refills: 0 | Status: SHIPPED | OUTPATIENT
Start: 2019-11-15 | End: 2019-11-26 | Stop reason: SDUPTHER
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
## 2019-11-15 NOTE — TELEPHONE ENCOUNTER
Spoke with patient she had a question  About the medication that dr vital is starting her on today she states they discussed two medications but he only wants to start one medication at a time since she is still having symptoms. Pt voiced understanding and will get the rx and start that one first.

## 2019-11-15 NOTE — TELEPHONE ENCOUNTER
----- Message from Manda Madrigal sent at 11/15/2019  3:31 PM CST -----  Contact: self  Type: Needs Medical Advice    Who Called:  self  Symptoms (please be specific):    How long has patient had these symptoms:    Pharmacy name and phone #:    Best Call Back Number: [phone] (home)   Additional Information: Patient has additional questions regarding the medications. Please call patient. Thanks!

## 2019-11-15 NOTE — TELEPHONE ENCOUNTER
----- Message from Mitchell Goodman sent at 11/15/2019  4:15 PM CST -----  Contact: Pt  Pt called and she a call from Cristy.    Pt would like a call back and can be reached at [phone]

## 2019-11-22 ENCOUNTER — TELEPHONE (OUTPATIENT)
Dept: SPINE | Facility: CLINIC | Age: 44
End: 2019-11-22

## 2019-11-22 ENCOUNTER — PATIENT MESSAGE (OUTPATIENT)
Dept: PHYSICAL MEDICINE AND REHAB | Facility: CLINIC | Age: 44
End: 2019-11-22

## 2019-11-22 RX ORDER — GABAPENTIN 300 MG/1
300 CAPSULE ORAL 3 TIMES DAILY
Qty: 90 CAPSULE | Refills: 2 | Status: SHIPPED | OUTPATIENT
Start: 2019-11-22 | End: 2019-12-04 | Stop reason: ALTCHOICE

## 2019-11-22 NOTE — TELEPHONE ENCOUNTER
----- Message from Vicky Jacobson sent at 11/22/2019  2:46 PM CST -----  Type: Needs Medical Advice    Who Called:  Patient   Best Call Back Number: [phone]  Additional Information: patient is requesting to speak to nurse regarding her previous message sent today, advise patient doctor will be out until monday

## 2019-11-22 NOTE — TELEPHONE ENCOUNTER
Refill sent. I  recommended 2-3 month follow up.  If she is still having neck pain she can come if for eval as I last saw her 9/24/19   Detail Level: Zone Samples Given: Retin A micro apply qhs Plan: Rx’s escribed to [facility]. Initiate Treatment: Aczone gel QAM, retin-A  gel 0.06%

## 2019-11-22 NOTE — TELEPHONE ENCOUNTER
Spoke with patient who wanted dr Berg to know that she was going to be working Monday then going out of town Tuesday for Thanksgiving in case you wanted to change or modify her medication

## 2019-11-26 ENCOUNTER — PATIENT MESSAGE (OUTPATIENT)
Dept: PHYSICAL MEDICINE AND REHAB | Facility: CLINIC | Age: 44
End: 2019-11-26

## 2019-11-26 RX ORDER — ZOLPIDEM TARTRATE 10 MG/1
TABLET ORAL
Qty: 30 TABLET | Refills: 0 | Status: SHIPPED | OUTPATIENT
Start: 2019-11-26 | End: 2019-12-27

## 2019-11-26 RX ORDER — AMITRIPTYLINE HYDROCHLORIDE 10 MG/1
10 TABLET, FILM COATED ORAL NIGHTLY PRN
Qty: 30 TABLET | Refills: 0 | Status: SHIPPED | OUTPATIENT
Start: 2019-11-26 | End: 2019-11-26 | Stop reason: SDUPTHER

## 2019-11-26 RX ORDER — AMITRIPTYLINE HYDROCHLORIDE 10 MG/1
10 TABLET, FILM COATED ORAL NIGHTLY PRN
Qty: 30 TABLET | Refills: 0 | Status: SHIPPED | OUTPATIENT
Start: 2019-11-26 | End: 2019-12-12 | Stop reason: SDUPTHER

## 2019-11-26 NOTE — TELEPHONE ENCOUNTER
----- Message from [place] sent at 11/26/2019  3:31 PM CST -----  Contact: patient   Type:  RX Refill Request    Who Called: patient   Refill or New Rx:  Refill   RX Name and Strength:  ambien   Preferred Pharmacy with phone number:    [id number] - [place] [address] [address] [facility] [address] [facility]  [address] [address]  Phone: [phone] Fax: [phone]  Best Call Back Number: [phone] (home)   Additional Information:  Pt need today will be going out of town tomorrow, its waiting on a approval

## 2019-12-02 ENCOUNTER — CLINICAL SUPPORT (OUTPATIENT)
Dept: REHABILITATION | Facility: HOSPITAL | Age: 44
End: 2019-12-02
Attending: ANESTHESIOLOGY
Payer: COMMERCIAL

## 2019-12-02 DIAGNOSIS — M53.82 DECREASED RANGE OF MOTION OF INTERVERTEBRAL DISCS OF CERVICAL SPINE: ICD-10-CM

## 2019-12-02 DIAGNOSIS — M54.2 NECK PAIN, BILATERAL: ICD-10-CM

## 2019-12-02 PROCEDURE — 97110 THERAPEUTIC EXERCISES: CPT | Mod: PO | Performed by: PHYSICAL THERAPIST

## 2019-12-02 NOTE — PROGRESS NOTES
[facility]      Name: Jory Vicentewain  Clinic Number: [id number]    Therapy Diagnosis:   Encounter Diagnoses   Name Primary?    Neck pain, bilateral     Decreased range of motion of intervertebral discs of cervical spine      Physician: Joaquin Delaney MD    Visit Date: 12/2/2019  Physician Orders: PT Eval and Treat   Medical Diagnosis from Referral:   Neck pain      Evaluation Date: 10/9/2019  Authorization Period Expiration: 12/31/2019  Plan of Care Expiration: 12/31/2019  Reassessment Due:11/08/2019  Visit # / Visits authorized: 6/ 20     Time In: 1700  Time Out: 1750  Total Billable Time: 40 minutes     Precautions: Standard     Pattern of pain determined: 1 REP    Subjective   Jory reported being away due to vacation, work and prior engagements. Patient reported he motion is getting better. Less HA noted. No longer having the throbbing effect. No radicular pain.    Patient reports tolerating previous visit with no adverse effects.  Patient reports their pain to be 0/10 on a 0-10 scale with 0 being no pain and 10 being the worst pain imaginable.  Pain Location: bilateral low cervical region     Work and leisure: Family time  Pt goals: Decrease pain to neck. Increase mobility for the neck     Objective   Baseline Isometric Testing on Med X equipment:  Testing administered by PT  Date of testing: 10/09/2019  ROM 12-90 deg   Max Peak Torque 193    Min Peak Torque 73    Flex/Ext Ratio 2.6/1   % below normative data 37     Outcomes:  Initial score:   CMS Impairment/Limitation/Restriction for FOTO Neck Survey  Status Limitation G-Code CMS Severity Modifier  Intake 55% 45% Current Status CK - At least 40 percent but less than 60 percent  Predicted 68% 32% Goal Status+ CJ - At least 20 percent but less than 40 percent    Visit 5 score:  Goal:    Treatment    Pt was instructed in and performed the following:   Previous:  REPEATED TEST MOVEMENTS:   Repeated Protraction in Sitting end  "range pain   Repeated Flexion in Sitting end range pain   Repeated Retraction in Sitting  no effect   Repeated Retraction Extension in Sitting no worse        Cervical rotation: 60 degrees actively, passively 64 degrees    Jory received therapeutic exercises to develop/improved posture, cardiovascular endurance, muscular endurance, lumbar/cervical ROM, strength and muscular endurance for 40 minutes including the following exercises:   UBE  Seated: cervical retraction 1/30", f/b 1/10 3", progressed to extension as well  Supine: scalene stretch 3/30" (anterior/middle)    Peripheral muscle strengthening which included 1 set of 15-20 repetitions at a slow, controlled 10-13 second per rep pace focused on strengthening supporting musculature for improved body mechanics and functional mobility.  Pt and therapist focused on proper form during treatment to ensure optimal strengthening of each targeted muscle group.  Machines were utilized including torso rotation, leg extension, leg curl, chest press, upright row. Tricep extension, bicep curl, leg press, and hip abduction added visit 3    [facility] 12/2/2019   Visit Number 6   VAS Pain Rating 0   Retraction in Sitting 11   Scapular Retraction -   Cervical Extension Seat Pad -   Seat Adjustment -   Top Dead Center -   Counterweight -   Cervical Flexion -   Cervical Extension -   Cervical Peak Torque -   Cervical Weight 159   Repetitions 20   Rating of Perceived Exertion 4     Jory received the following manual therapy techniques: Soft tissue Mobilization were applied to the: cervical/thoracic region for 5 minutes for mobility purposes.  Supine: manual traction with belt, intermittent, low tension.    Home Exercises Provided and Patient Education Provided     Education provided:   - Yes    Written Home Exercises Provided: Patient instructed to cont prior HEP.  Exercises were reviewed and JORY was able to demonstrate them prior to the end of the session.  JORY " demonstrated good  understanding of the education provided.     See EMR under Patient Instructions for exercises provided prior visit.    Assessment   Cervical rotation improvement. Responded well to manual traction. No adverse effects.    Patient is making good progress towards established goals.  Pt will continue to benefit from skilled outpatient physical therapy to address the deficits stated in the impairment chart, provide pt/family education and to maximize pt's level of independence in the home and community environment.     Anticipated Barriers for therapy: none  Pt's spiritual, cultural and educational needs considered and pt agreeable to plan of care and goals as stated below:     Goals:   Short term goals: 6 weeks or 10 visits   1.  Pt will demonstratte increased cervical ROM as measured by med ex by 5 degrees from initial test which results in improved  ROM of neck for ease with ADLs and driving  2. Pt will demonstrate independence with reducing or controlling symptoms with ther ex, movement, or position independently, able to reduce pain 1-2 points on pain scale using strategies taught in therapy  3. Pt will demonstrate increased maximum isometric torque value by 5% when compared to the initial value resulting in improved ability to perform bending, lifting, and carrying activities safely, confidently.     Long term goals: 13 weeks or 20 visits  1. Pt will demonstratte increased cervical ROM as measured by med ex by 5-8 degrees from initial test which results in functional ROM of neck for ease with ADLs and driving  2. Pt will demonstrate increased isometric torque by 10% from initial test to improve ability to lift and carry, and sustain good posture while performing ADL's  3.Pt will demonstrate reduced pain and improved functional outcomes as reported on the FOTO by reaching a score of CJ = at least 20% but < 40% impaired, limited or restricted or less in order to demonstrate subjective improvement  in pt's condition.    4. Pt will demonstrate independence with reducing or controlling symptoms with ther ex, movement, or position independently, able to reduce pain 2-4 points on pain scale using strategies taught in therapy  5. Pt will demonstrate independence with the HEP at discharge.   6.  Demonstrate left cervical rotation > 60 degrees for driving purposes with no painful limitations.       Plan   Continue with established Plan of Care towards established PT goals.

## 2019-12-04 ENCOUNTER — OFFICE VISIT (OUTPATIENT)
Dept: PHYSICAL MEDICINE AND REHAB | Facility: CLINIC | Age: 44
End: 2019-12-04
Payer: COMMERCIAL

## 2019-12-04 ENCOUNTER — PATIENT MESSAGE (OUTPATIENT)
Dept: PHYSICAL MEDICINE AND REHAB | Facility: CLINIC | Age: 44
End: 2019-12-04

## 2019-12-04 VITALS
DIASTOLIC BLOOD PRESSURE: 76 MMHG | HEART RATE: 73 BPM | SYSTOLIC BLOOD PRESSURE: 104 MMHG | BODY MASS INDEX: 31.7 KG/M2 | WEIGHT: 151 LBS | HEIGHT: 58 IN

## 2019-12-04 DIAGNOSIS — S06.0X0D CLOSED HEAD INJURY WITH CONCUSSION, WITHOUT LOSS OF CONSCIOUSNESS, SUBSEQUENT ENCOUNTER: Primary | ICD-10-CM

## 2019-12-04 DIAGNOSIS — G44.329 CHRONIC POST-TRAUMATIC HEADACHE, NOT INTRACTABLE: ICD-10-CM

## 2019-12-04 DIAGNOSIS — R41.3 MEMORY LOSS OR IMPAIRMENT: ICD-10-CM

## 2019-12-04 DIAGNOSIS — R53.83 FATIGUE, UNSPECIFIED TYPE: ICD-10-CM

## 2019-12-04 DIAGNOSIS — R41.840 POOR CONCENTRATION: ICD-10-CM

## 2019-12-04 PROCEDURE — 99999 PR PBB SHADOW E&M-EST. PATIENT-LVL III: CPT | Mod: PBBFAC,,, | Performed by: PHYSICAL MEDICINE & REHABILITATION

## 2019-12-04 PROCEDURE — 3008F BODY MASS INDEX DOCD: CPT | Mod: CPTII,S$GLB,, | Performed by: PHYSICAL MEDICINE & REHABILITATION

## 2019-12-04 PROCEDURE — 99214 OFFICE O/P EST MOD 30 MIN: CPT | Mod: S$GLB,,, | Performed by: PHYSICAL MEDICINE & REHABILITATION

## 2019-12-04 PROCEDURE — 99999 PR PBB SHADOW E&M-EST. PATIENT-LVL III: ICD-10-PCS | Mod: PBBFAC,,, | Performed by: PHYSICAL MEDICINE & REHABILITATION

## 2019-12-04 PROCEDURE — 3008F PR BODY MASS INDEX (BMI) DOCUMENTED: ICD-10-PCS | Mod: CPTII,S$GLB,, | Performed by: PHYSICAL MEDICINE & REHABILITATION

## 2019-12-04 PROCEDURE — 99214 PR OFFICE/OUTPT VISIT, EST, LEVL IV, 30-39 MIN: ICD-10-PCS | Mod: S$GLB,,, | Performed by: PHYSICAL MEDICINE & REHABILITATION
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
## 2019-12-07 DIAGNOSIS — F41.9 ANXIETY: ICD-10-CM

## 2019-12-07 RX ORDER — CLONAZEPAM 1 MG/1
TABLET ORAL
Qty: 60 TABLET | Refills: 0 | Status: SHIPPED | OUTPATIENT
Start: 2019-12-07 | End: 2020-01-06

## 2019-12-09 DIAGNOSIS — F41.9 ANXIETY: ICD-10-CM

## 2019-12-09 RX ORDER — CLONAZEPAM 1 MG/1
1 TABLET ORAL 2 TIMES DAILY PRN
Qty: 60 TABLET | Refills: 0 | Status: CANCELLED | OUTPATIENT
Start: 2019-12-09

## 2019-12-12 ENCOUNTER — PATIENT MESSAGE (OUTPATIENT)
Dept: PHYSICAL MEDICINE AND REHAB | Facility: CLINIC | Age: 44
End: 2019-12-12

## 2019-12-12 RX ORDER — AMITRIPTYLINE HYDROCHLORIDE 10 MG/1
10 TABLET, FILM COATED ORAL NIGHTLY PRN
Qty: 30 TABLET | Refills: 0 | Status: SHIPPED | OUTPATIENT
Start: 2019-12-12 | End: 2019-12-26

## 2019-12-16 ENCOUNTER — PATIENT MESSAGE (OUTPATIENT)
Dept: PHYSICAL MEDICINE AND REHAB | Facility: CLINIC | Age: 44
End: 2019-12-16

## 2019-12-18 ENCOUNTER — CLINICAL SUPPORT (OUTPATIENT)
Dept: REHABILITATION | Facility: HOSPITAL | Age: 44
End: 2019-12-18
Attending: ANESTHESIOLOGY
Payer: COMMERCIAL

## 2019-12-18 ENCOUNTER — PATIENT MESSAGE (OUTPATIENT)
Dept: PHYSICAL MEDICINE AND REHAB | Facility: CLINIC | Age: 44
End: 2019-12-18

## 2019-12-18 DIAGNOSIS — M54.2 NECK PAIN, BILATERAL: ICD-10-CM

## 2019-12-18 DIAGNOSIS — Q24.9 CARDIAC ABNORMALITY: Primary | ICD-10-CM

## 2019-12-18 DIAGNOSIS — M53.82 DECREASED RANGE OF MOTION OF INTERVERTEBRAL DISCS OF CERVICAL SPINE: ICD-10-CM

## 2019-12-18 PROCEDURE — 97110 THERAPEUTIC EXERCISES: CPT | Mod: PO | Performed by: PHYSICAL THERAPIST

## 2019-12-18 NOTE — PROGRESS NOTES
[facility]      Name: Jory Wheeler  Clinic Number: [id number]    Therapy Diagnosis:   Encounter Diagnoses   Name Primary?    Neck pain, bilateral     Decreased range of motion of intervertebral discs of cervical spine      Physician: Joaquin Delaney MD    Visit Date: 12/18/2019  Physician Orders: PT Eval and Treat   Medical Diagnosis from Referral:   Neck pain      Evaluation Date: 10/9/2019  Authorization Period Expiration: 12/31/2019  Plan of Care Expiration: 12/31/2019  Reassessment Due:11/08/2019  Visit # / Visits authorized: 7/ 20     Time In: 1620 (20 minutes late)  Time Out: 1700  Total Billable Time: 25 minutes     Precautions: Standard     Pattern of pain determined: 1 REP    Subjective   Jory reported having < neck pain and currently no pain at this time. HA intermittent but overall feeling better.    Patient reports tolerating previous visit with no adverse effects.  Patient reports their pain to be 0/10 on a 0-10 scale with 0 being no pain and 10 being the worst pain imaginable.  Pain Location: bilateral low cervical region     Work and leisure: Family time  Pt goals: Decrease pain to neck. Increase mobility for the neck     Objective   Baseline Isometric Testing on Med X equipment:  Testing administered by PT  Date of testing: 10/09/2019  ROM 12-90 deg   Max Peak Torque 193    Min Peak Torque 73    Flex/Ext Ratio 2.6/1   % below normative data 37     Outcomes:  Initial score:   CMS Impairment/Limitation/Restriction for FOTO Neck Survey  Status Limitation G-Code CMS Severity Modifier  Intake 55% 45% Current Status CK - At least 40 percent but less than 60 percent  Predicted 68% 32% Goal Status+ CJ - At least 20 percent but less than 40 percent    Visit 5 score:  Goal:    Treatment    Pt was instructed in and performed the following:   Previous:  REPEATED TEST MOVEMENTS:   Repeated Protraction in Sitting end range pain   Repeated Flexion in Sitting end range pain  "  Repeated Retraction in Sitting  no effect   Repeated Retraction Extension in Sitting no worse        Cervical rotation: 60 degrees actively, passively 64 degrees    Jory received therapeutic exercises to develop/improved posture, cardiovascular endurance, muscular endurance, lumbar/cervical ROM, strength and muscular endurance for 40 minutes including the following exercises:   UBE  Seated: cervical retraction 1/30", f/b 1/10 3", progressed to extension as well  Supine: scalene stretch 3/30" (anterior/middle)    Peripheral muscle strengthening which included 1 set of 15-20 repetitions at a slow, controlled 10-13 second per rep pace focused on strengthening supporting musculature for improved body mechanics and functional mobility.  Pt and therapist focused on proper form during treatment to ensure optimal strengthening of each targeted muscle group.  Machines were utilized including torso rotation, leg extension, leg curl, chest press, upright row. Tricep extension, bicep curl, leg press, and hip abduction added visit 3    [facility] 12/18/2019   Visit Number 7   VAS Pain Rating 0   Retraction in Sitting 11   Scapular Retraction -   Cervical Extension Seat Pad -   Seat Adjustment -   Top Dead Center -   Counterweight -   Cervical Flexion -   Cervical Extension -   Cervical Peak Torque -   Cervical Weight 168   Repetitions 20   Rating of Perceived Exertion 3         Home Exercises Provided and Patient Education Provided     Education provided:   - Yes    Written Home Exercises Provided: Patient instructed to cont prior HEP.  Exercises were reviewed and JORY was able to demonstrate them prior to the end of the session.  JORY demonstrated good  understanding of the education provided.     See EMR under Patient Instructions for exercises provided prior visit.    Assessment   Good tolerance with progression.  Cervical retraction with extension noted with no pain.    Patient is making good progress towards " established goals.  Pt will continue to benefit from skilled outpatient physical therapy to address the deficits stated in the impairment chart, provide pt/family education and to maximize pt's level of independence in the home and community environment.     Anticipated Barriers for therapy: none  Pt's spiritual, cultural and educational needs considered and pt agreeable to plan of care and goals as stated below:     Goals:   Short term goals: 6 weeks or 10 visits   1.  Pt will demonstratte increased cervical ROM as measured by med ex by 5 degrees from initial test which results in improved  ROM of neck for ease with ADLs and driving  2. Pt will demonstrate independence with reducing or controlling symptoms with ther ex, movement, or position independently, able to reduce pain 1-2 points on pain scale using strategies taught in therapy  3. Pt will demonstrate increased maximum isometric torque value by 5% when compared to the initial value resulting in improved ability to perform bending, lifting, and carrying activities safely, confidently.     Long term goals: 13 weeks or 20 visits  1. Pt will demonstratte increased cervical ROM as measured by med ex by 5-8 degrees from initial test which results in functional ROM of neck for ease with ADLs and driving  2. Pt will demonstrate increased isometric torque by 10% from initial test to improve ability to lift and carry, and sustain good posture while performing ADL's  3.Pt will demonstrate reduced pain and improved functional outcomes as reported on the FOTO by reaching a score of CJ = at least 20% but < 40% impaired, limited or restricted or less in order to demonstrate subjective improvement in pt's condition.    4. Pt will demonstrate independence with reducing or controlling symptoms with ther ex, movement, or position independently, able to reduce pain 2-4 points on pain scale using strategies taught in therapy  5. Pt will demonstrate independence with the HEP at  discharge.   6.  Demonstrate left cervical rotation > 60 degrees for driving purposes with no painful limitations.       Plan   Continue with established Plan of Care towards established PT goals.

## 2019-12-19 ENCOUNTER — PATIENT MESSAGE (OUTPATIENT)
Dept: PHYSICAL MEDICINE AND REHAB | Facility: CLINIC | Age: 44
End: 2019-12-19

## 2019-12-26 ENCOUNTER — HOSPITAL ENCOUNTER (OUTPATIENT)
Dept: CARDIOLOGY | Facility: HOSPITAL | Age: 44
Discharge: HOME | End: 2019-12-26
Attending: PHYSICAL MEDICINE & REHABILITATION
Payer: COMMERCIAL

## 2019-12-26 ENCOUNTER — PATIENT MESSAGE (OUTPATIENT)
Dept: PHYSICAL MEDICINE AND REHAB | Facility: CLINIC | Age: 44
End: 2019-12-26

## 2019-12-26 DIAGNOSIS — Q24.9 CARDIAC ABNORMALITY: ICD-10-CM

## 2019-12-26 PROCEDURE — 93005 ELECTROCARDIOGRAM TRACING: CPT

## 2019-12-26 RX ORDER — AMITRIPTYLINE HYDROCHLORIDE 10 MG/1
20 TABLET, FILM COATED ORAL NIGHTLY
Qty: 60 TABLET | Refills: 0 | Status: SHIPPED | OUTPATIENT
Start: 2019-12-26 | End: 2020-01-17

## 2019-12-27 ENCOUNTER — PATIENT MESSAGE (OUTPATIENT)
Dept: PHYSICAL MEDICINE AND REHAB | Facility: CLINIC | Age: 44
End: 2019-12-27

## 2019-12-27 RX ORDER — AMANTADINE HYDROCHLORIDE 100 MG/1
100 CAPSULE, GELATIN COATED ORAL 2 TIMES DAILY
Qty: 20 CAPSULE | Refills: 0 | Status: SHIPPED | OUTPATIENT
Start: 2019-12-27 | End: 2019-12-31 | Stop reason: ALTCHOICE

## 2019-12-27 RX ORDER — ZOLPIDEM TARTRATE 10 MG/1
TABLET ORAL
Qty: 30 TABLET | Refills: 0 | Status: SHIPPED | OUTPATIENT
Start: 2019-12-27 | End: 2020-01-29 | Stop reason: SDUPTHER

## 2019-12-31 ENCOUNTER — OFFICE VISIT (OUTPATIENT)
Dept: PHYSICAL MEDICINE AND REHAB | Facility: CLINIC | Age: 44
End: 2019-12-31
Payer: COMMERCIAL

## 2019-12-31 VITALS
HEIGHT: 58 IN | SYSTOLIC BLOOD PRESSURE: 111 MMHG | BODY MASS INDEX: 31.7 KG/M2 | WEIGHT: 151 LBS | DIASTOLIC BLOOD PRESSURE: 76 MMHG | HEART RATE: 62 BPM

## 2019-12-31 DIAGNOSIS — G44.329 CHRONIC POST-TRAUMATIC HEADACHE, NOT INTRACTABLE: ICD-10-CM

## 2019-12-31 DIAGNOSIS — R53.82 CHRONIC FATIGUE: ICD-10-CM

## 2019-12-31 DIAGNOSIS — F07.81 POST CONCUSSIVE SYNDROME: ICD-10-CM

## 2019-12-31 DIAGNOSIS — R41.840 CONCENTRATION DEFICIT: ICD-10-CM

## 2019-12-31 DIAGNOSIS — F33.2 SEVERE EPISODE OF RECURRENT MAJOR DEPRESSIVE DISORDER, WITHOUT PSYCHOTIC FEATURES: Primary | ICD-10-CM

## 2019-12-31 DIAGNOSIS — S06.0X0D CLOSED HEAD INJURY WITH CONCUSSION, WITHOUT LOSS OF CONSCIOUSNESS, SUBSEQUENT ENCOUNTER: ICD-10-CM

## 2019-12-31 PROCEDURE — 99214 OFFICE O/P EST MOD 30 MIN: CPT | Mod: S$GLB,,, | Performed by: PHYSICAL MEDICINE & REHABILITATION

## 2019-12-31 PROCEDURE — 3008F BODY MASS INDEX DOCD: CPT | Mod: CPTII,S$GLB,, | Performed by: PHYSICAL MEDICINE & REHABILITATION

## 2019-12-31 PROCEDURE — 99999 PR PBB SHADOW E&M-EST. PATIENT-LVL III: CPT | Mod: PBBFAC,,, | Performed by: PHYSICAL MEDICINE & REHABILITATION

## 2019-12-31 PROCEDURE — 3008F PR BODY MASS INDEX (BMI) DOCUMENTED: ICD-10-PCS | Mod: CPTII,S$GLB,, | Performed by: PHYSICAL MEDICINE & REHABILITATION

## 2019-12-31 PROCEDURE — 99214 PR OFFICE/OUTPT VISIT, EST, LEVL IV, 30-39 MIN: ICD-10-PCS | Mod: S$GLB,,, | Performed by: PHYSICAL MEDICINE & REHABILITATION

## 2019-12-31 PROCEDURE — 99999 PR PBB SHADOW E&M-EST. PATIENT-LVL III: ICD-10-PCS | Mod: PBBFAC,,, | Performed by: PHYSICAL MEDICINE & REHABILITATION

## 2019-12-31 RX ORDER — METHYLPHENIDATE HYDROCHLORIDE 5 MG/1
5 TABLET ORAL 2 TIMES DAILY WITH MEALS
Qty: 42 TABLET | Refills: 0 | Status: SHIPPED | OUTPATIENT
Start: 2019-12-31 | End: 2020-01-17 | Stop reason: SDUPTHER
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
## 2020-01-02 ENCOUNTER — PATIENT MESSAGE (OUTPATIENT)
Dept: PHYSICAL MEDICINE AND REHAB | Facility: CLINIC | Age: 45
End: 2020-01-02

## 2020-01-03 ENCOUNTER — CLINICAL SUPPORT (OUTPATIENT)
Dept: REHABILITATION | Facility: HOSPITAL | Age: 45
End: 2020-01-03
Attending: ANESTHESIOLOGY
Payer: COMMERCIAL

## 2020-01-03 DIAGNOSIS — M54.2 NECK PAIN, BILATERAL: ICD-10-CM

## 2020-01-03 DIAGNOSIS — M53.82 DECREASED RANGE OF MOTION OF INTERVERTEBRAL DISCS OF CERVICAL SPINE: ICD-10-CM

## 2020-01-03 PROCEDURE — 97110 THERAPEUTIC EXERCISES: CPT | Mod: PO | Performed by: PHYSICAL THERAPIST

## 2020-01-03 NOTE — PLAN OF CARE
TIME RECORD    Date: 01/03/2020  Patient name: Jory Wheeler  Onset Date: 08/21/2019  SOC Date:  10/09/2020  Treatment Diagnosis:    1. Neck pain, bilateral     2. Decreased range of motion of intervertebral discs of cervical spine       Precautions:  standard  Visits from SOC:  8  Functional Level Prior to SOC:    Prior Level of Function: independent  Current Level of Function: modified independent, increase time noted with home management tasks    Updated Assessment:    Patient currently reporting < duration of neck pain and feels better after going to therapy.   Patient reported intermittent HA along with left side neck pain, intermittent, localized as well.  Cervical ROM: improvement noted  cervical left rotation 65 degrees, cervical right rotation 71 degrees    Previous Short Term Goals Status:    Short term goals: 6 weeks or 10 visits   1.  Pt will demonstratte increased cervical ROM as measured by med ex by 5 degrees from initial test which results in improved  ROM of neck for ease with ADLs and driving. Ongoing  2. Pt will demonstrate independence with reducing or controlling symptoms with ther ex, movement, or position independently, able to reduce pain 1-2 points on pain scale using strategies taught in therapy Met  3. Pt will demonstrate increased maximum isometric torque value by 5% when compared to the initial value resulting in improved ability to perform bending, lifting, and carrying activities safely, confidently  New Short Term Goals Status:   Patient will demonstrate cervical rotation > /= 70 degrees for mobility/driving purposes.  Long Term Goal Status:   continue per initial plan of care.  Reasons for Recertification of Therapy:   Continuation of cervical mobility, cervical extensor strengthening along with postural education for ADL purposes.    Certification Period: 1/3/2020 to 2/28/2020  Recommended Treatment Plan: 2 times per week for 8 weeks: Electrical Stimulation IFC, Manual Therapy,  Moist Heat/ Ice, Neuromuscular Re-ed, Patient Education, Therapeutic Activites and Therapeutic Exercise  Other Recommendations: Thank you for consult.    Therapist's Name: Kojo Rhoades PT, JULIANA, MTC    Date: 01/03/2020    I CERTIFY THE NEED FOR THESE SERVICES FURNISHED UNDER THIS PLAN OF TREATMENT AND WHILE UNDER MY CARE    Physician's comments: ________________________________________________________________________________________________________________________________________________      Physician's Name: ___________________________________

## 2020-01-03 NOTE — PROGRESS NOTES
[facility]      Name: Jory Vicentewain  Clinic Number: [id number]    Therapy Diagnosis:   Encounter Diagnoses   Name Primary?    Neck pain, bilateral     Decreased range of motion of intervertebral discs of cervical spine      Physician: Joaquin Delaney MD    Visit Date: 1/3/2020  Physician Orders: PT Eval and Treat   Medical Diagnosis from Referral:   Neck pain      Evaluation Date: 10/9/2019  Authorization Period Expiration: 12/31/2019  Plan of Care Expiration: 12/31/2019  Reassessment Due:11/08/2019  Visit # / Visits authorized: 8/ 20     Time In: 1115 (15 min late)  Time Out: 1220  Total Billable Time:40  minutes     Precautions: Standard     Pattern of pain determined: 1 REP    Subjective   Jory reported feeling better from previous session but has missed therapy due to change in new job and holidays. Patient is motivated on continuing therapy as well. No new s/s. Left side neck pain, intermittent noted and localized.     Patient reports tolerating previous visit with no adverse effects.  Patient reports their pain to be 0/10 on a 0-10 scale with 0 being no pain and 10 being the worst pain imaginable.  Pain Location: bilateral low cervical region     Work and leisure: Family time  Pt goals: Decrease pain to neck. Increase mobility for the neck     Objective   Baseline Isometric Testing on Med X equipment:  Testing administered by PT  Date of testing: 10/09/2019  ROM 12-90 deg   Max Peak Torque 193    Min Peak Torque 73    Flex/Ext Ratio 2.6/1   % below normative data 37     Outcomes:  Initial score:   CMS Impairment/Limitation/Restriction for FOTO Neck Survey  Status Limitation G-Code CMS Severity Modifier  Intake 55% 45% Current Status CK - At least 40 percent but less than 60 percent  Predicted 68% 32% Goal Status+ CJ - At least 20 percent but less than 40 percent    Visit 5 score:  Goal:    Treatment    Pt was instructed in and performed the following:  "  Previous:  REPEATED TEST MOVEMENTS:   Repeated Protraction in Sitting end range pain   Repeated Flexion in Sitting end range pain   Repeated Retraction in Sitting  no effect   Repeated Retraction Extension in Sitting no worse        Cervical rotation: cervical left rotation 65 degrees, cervical right rotation 71 degrees    Jory received therapeutic exercises to develop/improved posture, cardiovascular endurance, muscular endurance, lumbar/cervical ROM, strength and muscular endurance for 40 minutes including the following exercises:   UBE 3 minutes fwd/bwd each  Seated: cervical retraction 1/30", f/b 1/10 3", progressed to extension as well  Supine: scalene stretch 3/30" (anterior/middle)    Peripheral muscle strengthening which included 1 set of 15-20 repetitions at a slow, controlled 10-13 second per rep pace focused on strengthening supporting musculature for improved body mechanics and functional mobility.  Pt and therapist focused on proper form during treatment to ensure optimal strengthening of each targeted muscle group.  Machines were utilized including torso rotation, leg extension, leg curl, chest press, upright row. Tricep extension, bicep curl, leg press, and hip abduction added visit 3    [facility] 1/3/2020   Visit Number 8   VAS Pain Rating 0   Retraction in Sitting 11   Scapular Retraction -   Cervical Extension Seat Pad -   Seat Adjustment -   Top Dead Center -   Counterweight -   Cervical Flexion -   Cervical Extension -   Cervical Peak Torque -   Cervical Weight 174   Repetitions 20   Rating of Perceived Exertion 4         Home Exercises Provided and Patient Education Provided     Education provided:   - Yes    Written Home Exercises Provided: Patient instructed to cont prior HEP.  Exercises were reviewed and JORY was able to demonstrate them prior to the end of the session.  JORY demonstrated good  understanding of the education provided.     See EMR under Patient Instructions for " exercises provided prior visit.    Assessment   Improvement in cervical rotation noted. No adverse effects.  Patient reported she feels better(more motion/flexible after active warm up). No adverse effects.    Patient is making good progress towards established goals.  Pt will continue to benefit from skilled outpatient physical therapy to address the deficits stated in the impairment chart, provide pt/family education and to maximize pt's level of independence in the home and community environment.     Anticipated Barriers for therapy: none  Pt's spiritual, cultural and educational needs considered and pt agreeable to plan of care and goals as stated below:     Goals:   Short term goals: 6 weeks or 10 visits   1.  Pt will demonstratte increased cervical ROM as measured by med ex by 5 degrees from initial test which results in improved  ROM of neck for ease with ADLs and driving. Ongoing  2. Pt will demonstrate independence with reducing or controlling symptoms with ther ex, movement, or position independently, able to reduce pain 1-2 points on pain scale using strategies taught in therapy  3. Pt will demonstrate increased maximum isometric torque value by 5% when compared to the initial value resulting in improved ability to perform bending, lifting, and carrying activities safely, confidently.     Long term goals: 13 weeks or 20 visits  1. Pt will demonstratte increased cervical ROM as measured by med ex by 5-8 degrees from initial test which results in functional ROM of neck for ease with ADLs and driving  2. Pt will demonstrate increased isometric torque by 10% from initial test to improve ability to lift and carry, and sustain good posture while performing ADL's  3.Pt will demonstrate reduced pain and improved functional outcomes as reported on the FOTO by reaching a score of CJ = at least 20% but < 40% impaired, limited or restricted or less in order to demonstrate subjective improvement in pt's condition.     4. Pt will demonstrate independence with reducing or controlling symptoms with ther ex, movement, or position independently, able to reduce pain 2-4 points on pain scale using strategies taught in therapy  5. Pt will demonstrate independence with the HEP at discharge.   6.  Demonstrate left cervical rotation > 60 degrees for driving purposes with no painful limitations. Met, 1/3/2020       Plan   Continue with established Plan of Care towards established PT goals.

## 2020-01-05 DIAGNOSIS — F41.9 ANXIETY: ICD-10-CM

## 2020-01-06 RX ORDER — CLONAZEPAM 1 MG/1
TABLET ORAL
Qty: 60 TABLET | Refills: 0 | Status: SHIPPED | OUTPATIENT
Start: 2020-01-06 | End: 2020-02-05 | Stop reason: SDUPTHER

## 2020-01-10 ENCOUNTER — CLINICAL SUPPORT (OUTPATIENT)
Dept: REHABILITATION | Facility: HOSPITAL | Age: 45
End: 2020-01-10
Attending: ANESTHESIOLOGY
Payer: COMMERCIAL

## 2020-01-10 DIAGNOSIS — M54.2 NECK PAIN, BILATERAL: ICD-10-CM

## 2020-01-10 DIAGNOSIS — M53.82 DECREASED RANGE OF MOTION OF INTERVERTEBRAL DISCS OF CERVICAL SPINE: ICD-10-CM

## 2020-01-10 PROCEDURE — 97110 THERAPEUTIC EXERCISES: CPT | Mod: PO | Performed by: PHYSICAL THERAPIST

## 2020-01-13 PROBLEM — F07.81 POST CONCUSSION SYNDROME: Status: ACTIVE | Noted: 2020-01-13

## 2020-01-15 ENCOUNTER — PATIENT MESSAGE (OUTPATIENT)
Dept: PHYSICAL MEDICINE AND REHAB | Facility: CLINIC | Age: 45
End: 2020-01-15

## 2020-01-17 ENCOUNTER — PATIENT MESSAGE (OUTPATIENT)
Dept: FAMILY MEDICINE | Facility: CLINIC | Age: 45
End: 2020-01-17

## 2020-01-17 ENCOUNTER — OFFICE VISIT (OUTPATIENT)
Dept: PHYSICAL MEDICINE AND REHAB | Facility: CLINIC | Age: 45
End: 2020-01-17
Payer: COMMERCIAL

## 2020-01-17 ENCOUNTER — CLINICAL SUPPORT (OUTPATIENT)
Dept: REHABILITATION | Facility: HOSPITAL | Age: 45
End: 2020-01-17
Attending: ANESTHESIOLOGY
Payer: COMMERCIAL

## 2020-01-17 VITALS — BODY MASS INDEX: 31.7 KG/M2 | WEIGHT: 151 LBS | HEIGHT: 58 IN

## 2020-01-17 DIAGNOSIS — R41.840 CONCENTRATION DEFICIT: ICD-10-CM

## 2020-01-17 DIAGNOSIS — G43.909 MIGRAINE WITHOUT STATUS MIGRAINOSUS, NOT INTRACTABLE, UNSPECIFIED MIGRAINE TYPE: Primary | ICD-10-CM

## 2020-01-17 DIAGNOSIS — Z87.820 HISTORY OF CONCUSSION: ICD-10-CM

## 2020-01-17 DIAGNOSIS — M53.82 DECREASED RANGE OF MOTION OF INTERVERTEBRAL DISCS OF CERVICAL SPINE: ICD-10-CM

## 2020-01-17 DIAGNOSIS — M54.2 NECK PAIN, BILATERAL: ICD-10-CM

## 2020-01-17 DIAGNOSIS — G44.329 CHRONIC POST-TRAUMATIC HEADACHE, NOT INTRACTABLE: Primary | ICD-10-CM

## 2020-01-17 DIAGNOSIS — S13.4XXD WHIPLASH INJURY TO NECK, SUBSEQUENT ENCOUNTER: ICD-10-CM

## 2020-01-17 DIAGNOSIS — F33.2 SEVERE EPISODE OF RECURRENT MAJOR DEPRESSIVE DISORDER, WITHOUT PSYCHOTIC FEATURES: ICD-10-CM

## 2020-01-17 DIAGNOSIS — M50.30 DDD (DEGENERATIVE DISC DISEASE), CERVICAL: ICD-10-CM

## 2020-01-17 DIAGNOSIS — Z12.39 BREAST CANCER SCREENING: ICD-10-CM

## 2020-01-17 PROCEDURE — 99999 PR PBB SHADOW E&M-EST. PATIENT-LVL II: ICD-10-PCS | Mod: PBBFAC,,, | Performed by: PHYSICAL MEDICINE & REHABILITATION

## 2020-01-17 PROCEDURE — 3008F BODY MASS INDEX DOCD: CPT | Mod: CPTII,S$GLB,, | Performed by: PHYSICAL MEDICINE & REHABILITATION

## 2020-01-17 PROCEDURE — 99213 PR OFFICE/OUTPT VISIT, EST, LEVL III, 20-29 MIN: ICD-10-PCS | Mod: S$GLB,,, | Performed by: PHYSICAL MEDICINE & REHABILITATION

## 2020-01-17 PROCEDURE — 97110 THERAPEUTIC EXERCISES: CPT | Mod: PO | Performed by: PHYSICAL THERAPIST

## 2020-01-17 PROCEDURE — 99999 PR PBB SHADOW E&M-EST. PATIENT-LVL II: CPT | Mod: PBBFAC,,, | Performed by: PHYSICAL MEDICINE & REHABILITATION

## 2020-01-17 PROCEDURE — 99213 OFFICE O/P EST LOW 20 MIN: CPT | Mod: S$GLB,,, | Performed by: PHYSICAL MEDICINE & REHABILITATION

## 2020-01-17 PROCEDURE — 3008F PR BODY MASS INDEX (BMI) DOCUMENTED: ICD-10-PCS | Mod: CPTII,S$GLB,, | Performed by: PHYSICAL MEDICINE & REHABILITATION

## 2020-01-17 RX ORDER — METHYLPHENIDATE HYDROCHLORIDE 5 MG/1
5 TABLET ORAL 2 TIMES DAILY WITH MEALS
Qty: 42 TABLET | Refills: 0 | Status: SHIPPED | OUTPATIENT
Start: 2020-01-17 | End: 2020-02-11 | Stop reason: SDUPTHER

## 2020-01-17 RX ORDER — AMITRIPTYLINE HYDROCHLORIDE 25 MG/1
25 TABLET, FILM COATED ORAL NIGHTLY
Qty: 30 TABLET | Refills: 1 | Status: SHIPPED | OUTPATIENT
Start: 2020-01-17 | End: 2020-03-05 | Stop reason: SDUPTHER

## 2020-01-17 NOTE — PROGRESS NOTES
[facility]      Name: Jory Wheeler  Clinic Number: [id number]    Therapy Diagnosis:   Encounter Diagnoses   Name Primary?    Neck pain, bilateral     Decreased range of motion of intervertebral discs of cervical spine      Physician: Joaquin Delaney MD    Visit Date: 1/17/2020  Physician Orders: PT Eval and Treat   Medical Diagnosis from Referral:   Neck pain      Evaluation Date: 10/9/2019  Authorization Period Expiration: 12/31/2019  Plan of Care Expiration: 2/28/2020  Reassessment Due:11/08/2019  Visit # / Visits authorized: 10/ 20     Time In: 1000  Time Out: 1100  Total Billable Time:55 minutes     Precautions: Standard     Pattern of pain determined: 1 REP    Subjective   Jory reported having on/off headaches starting one sided. Also reports having migraine as well.  No neck pain at this time.    Patient reports tolerating previous visit with no adverse effects.  Patient reports their pain to be 0/10 on a 0-10 scale with 0 being no pain and 10 being the worst pain imaginable.  Pain Location: bilateral low cervical region     Work and leisure: Family time  Pt goals: Decrease pain to neck. Increase mobility for the neck     Objective   Baseline Isometric Testing on Med X equipment:  Testing administered by PT  Date of testing: 10/09/2019  ROM 12-90  6-96   Max Peak Torque 193  199   Min Peak Torque 73  166   Flex/Ext Ratio 2.6/1 1.2/1   % below normative data 37 6     Outcomes:  Initial score:   CMS Impairment/Limitation/Restriction for FOTO Neck Survey  Status Limitation G-Code CMS Severity Modifier  Intake 55% 45% Current Status CK - At least 40 percent but less than 60 percent  Predicted 68% 32% Goal Status+ CJ - At least 20 percent but less than 40 percent    Visit 5 score:  Goal:    Treatment    Pt was instructed in and performed the following:   Previous:  REPEATED TEST MOVEMENTS:   Repeated Protraction in Sitting end range pain   Repeated Flexion in Sitting end  "range pain   Repeated Retraction in Sitting  no effect   Repeated Retraction Extension in Sitting no worse      Cervical rotation: cervical left rotation 65 degrees, cervical right rotation 71 degrees (previous)    Jory received therapeutic exercises to develop/improved posture, cardiovascular endurance, muscular endurance, lumbar/cervical ROM, strength and muscular endurance for 55 minutes including the following exercises:     UBE 3 minutes fwd/bwd each  Seated: cervical retraction 1/30", f/b 1/10 3", progressed to extension as well  Supine: scalene stretch 3/30" (anterior/middle)    Manual: cervical retraction with belt, intermittent x 5 minutes.    Peripheral muscle strengthening which included 1 set of 15-20 repetitions at a slow, controlled 10-13 second per rep pace focused on strengthening supporting musculature for improved body mechanics and functional mobility.  Pt and therapist focused on proper form during treatment to ensure optimal strengthening of each targeted muscle group.  Machines were utilized including torso rotation, leg extension, leg curl, chest press, upright row. Tricep extension, bicep curl, leg press, and hip abduction added visit 3                      [facility] 1/17/2020   Visit Number 10   VAS Pain Rating 0   Retraction in Sitting 10   Scapular Retraction -   Cervical Extension Seat Pad 2   Seat Adjustment 417   Top Dead Center 66   Counterweight 0.4   Cervical Flexion 96   Cervical Extension 6   Cervical Peak Torque 199   Cervical Weight -   Repetitions -   Rating of Perceived Exertion -       Home Exercises Provided and Patient Education Provided     Education provided:   - Yes    Written Home Exercises Provided: Patient instructed to cont prior HEP.  Exercises were reviewed and JORY was able to demonstrate them prior to the end of the session.  JORY demonstrated good  understanding of the education provided.     See EMR under Patient Instructions for exercises provided " prior visit.    Assessment     Patient retested at visit 10 and shows improvement in:  Improved posture, using lumbar roll  Improved lumbar/cervical  ROM,  12-90 initially on med ex test and 6-96  currently  Improved strength at each test point on lumbar med ex IM test with  69 average improvement noted with Reduced pain  Noted by patient    Patient is making good progress towards established goals.  Pt will continue to benefit from skilled outpatient physical therapy to address the deficits stated in the impairment chart, provide pt/family education and to maximize pt's level of independence in the home and community environment.     Anticipated Barriers for therapy: none  Pt's spiritual, cultural and educational needs considered and pt agreeable to plan of care and goals as stated below:     Goals:   Short term goals: 6 weeks or 10 visits   1.  Pt will demonstratte increased cervical ROM as measured by med ex by 5 degrees from initial test which results in improved  ROM of neck for ease with ADLs and driving. Met, 1/17/20  2. Pt will demonstrate independence with reducing or controlling symptoms with ther ex, movement, or position independently, able to reduce pain 1-2 points on pain scale using strategies taught in therapy. Ongoing  3. Pt will demonstrate increased maximum isometric torque value by 5% when compared to the initial value resulting in improved ability to perform bending, lifting, and carrying activities safely, confidently. Met, 11/17/2020     Long term goals: 13 weeks or 20 visits  1. Pt will demonstratte increased cervical ROM as measured by med ex by 5-8 degrees from initial test which results in functional ROM of neck for ease with ADLs and driving  2. Pt will demonstrate increased isometric torque by 10% from initial test to improve ability to lift and carry, and sustain good posture while performing ADL's  3.Pt will demonstrate reduced pain and improved functional outcomes as reported on the  FOTO by reaching a score of CJ = at least 20% but < 40% impaired, limited or restricted or less in order to demonstrate subjective improvement in pt's condition.    4. Pt will demonstrate independence with reducing or controlling symptoms with ther ex, movement, or position independently, able to reduce pain 2-4 points on pain scale using strategies taught in therapy  5. Pt will demonstrate independence with the HEP at discharge.   6.  Demonstrate left cervical rotation > 60 degrees for driving purposes with no painful limitations. Met, 1/3/2020       Plan   Continue with established Plan of Care towards established PT goals.
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
## 2020-01-20 RX ORDER — SUMATRIPTAN 50 MG/1
TABLET, FILM COATED ORAL
Qty: 10 TABLET | Refills: 2 | Status: SHIPPED | OUTPATIENT
Start: 2020-01-20 | End: 2020-06-08

## 2020-01-20 NOTE — TELEPHONE ENCOUNTER
Attempted to call patient.    I think the next step will be referral to headache specialist.  You are on a migraine preventative (amitriptyline), have been treated for post concussive syndrome, and are taking acute migraine treatment (Imitrex).  It may take some time to get in with headache specialist but I think that will be the most beneficial for you.  Will refill sumatriptan.
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
## 2020-01-22 ENCOUNTER — CLINICAL SUPPORT (OUTPATIENT)
Dept: REHABILITATION | Facility: HOSPITAL | Age: 45
End: 2020-01-22
Attending: ANESTHESIOLOGY
Payer: COMMERCIAL

## 2020-01-22 DIAGNOSIS — M54.2 NECK PAIN, BILATERAL: ICD-10-CM

## 2020-01-22 DIAGNOSIS — M53.82 DECREASED RANGE OF MOTION OF INTERVERTEBRAL DISCS OF CERVICAL SPINE: ICD-10-CM

## 2020-01-22 PROCEDURE — 97110 THERAPEUTIC EXERCISES: CPT | Mod: PO | Performed by: PHYSICAL THERAPIST

## 2020-01-22 NOTE — PROGRESS NOTES
[facility]      Name: Jory Wheeler  Clinic Number: [id number]    Therapy Diagnosis:   Encounter Diagnoses   Name Primary?    Neck pain, bilateral     Decreased range of motion of intervertebral discs of cervical spine      Physician: Joaquin Delaney MD    Visit Date: 1/22/2020  Physician Orders: PT Eval and Treat   Medical Diagnosis from Referral:   Neck pain      Evaluation Date: 10/9/2019  Authorization Period Expiration: 12/31/2019  Plan of Care Expiration: 2/28/2020  Reassessment Due:11/08/2019  Visit # / Visits authorized: 11/ 20     Time In: 1515  Time Out: 1600  Total Billable Time:45 minutes     Precautions: Standard     Pattern of pain determined: 1 REP    Subjective   Jory reported feeling better since last session. No pain at this time.    Patient reports tolerating previous visit with no adverse effects.  Patient reports their pain to be 0/10 on a 0-10 scale with 0 being no pain and 10 being the worst pain imaginable.  Pain Location: bilateral low cervical region     Work and leisure: Family time  Pt goals: Decrease pain to neck. Increase mobility for the neck     Objective   Baseline Isometric Testing on Med X equipment:  Testing administered by PT  Date of testing: 10/09/2019  ROM 12-90  6-96   Max Peak Torque 193  199   Min Peak Torque 73  166   Flex/Ext Ratio 2.6/1 1.2/1   % below normative data 37 6     Outcomes:  Initial score:   CMS Impairment/Limitation/Restriction for FOTO Neck Survey  Status Limitation G-Code CMS Severity Modifier  Intake 55% 45% Current Status CK - At least 40 percent but less than 60 percent  Predicted 68% 32% Goal Status+ CJ - At least 20 percent but less than 40 percent    Visit 5 score:  Goal:    Treatment    Pt was instructed in and performed the following:   Previous:  REPEATED TEST MOVEMENTS:   Repeated Protraction in Sitting end range pain   Repeated Flexion in Sitting end range pain   Repeated Retraction in Sitting  no effect  "  Repeated Retraction Extension in Sitting no worse      Cervical rotation: cervical left rotation 65 degrees, cervical right rotation 71 degrees (previous)    Jory received therapeutic exercises to develop/improved posture, cardiovascular endurance, muscular endurance, lumbar/cervical ROM, strength and muscular endurance for 45 minutes including the following exercises:     UBE 3 minutes fwd/bwd each  Seated: cervical retraction 1/30", f/b 1/10 3", progressed to extension as well  Supine: scalene stretch 3/30" (anterior/middle) (not performed)    Prone: cervical retraction with extension for strengthening DNF 1/10    Manual: cervical retraction with belt, intermittent x 5 minutes.    Peripheral muscle strengthening which included 1 set of 15-20 repetitions at a slow, controlled 10-13 second per rep pace focused on strengthening supporting musculature for improved body mechanics and functional mobility.  Pt and therapist focused on proper form during treatment to ensure optimal strengthening of each targeted muscle group.  Machines were utilized including torso rotation, leg extension, leg curl, chest press, upright row. Tricep extension, bicep curl, leg press, and hip abduction added visit 3    [facility] 1/22/2020   Visit Number 11   VAS Pain Rating 0   Retraction in Sitting 10   Scapular Retraction -   Cervical Extension Seat Pad -   Seat Adjustment -   Top Dead Center -   Counterweight -   Cervical Flexion -   Cervical Extension -   Cervical Peak Torque -   Cervical Weight 174   Repetitions 20   Rating of Perceived Exertion 3       Home Exercises Provided and Patient Education Provided     Education provided:   - Yes  -prone: cervical retraction with extension 2/10 2/day    Written Home Exercises Provided: Patient instructed to cont prior HEP.  Exercises were reviewed and JORY was able to demonstrate them prior to the end of the session.  JORY demonstrated good  understanding of the education " provided.     See EMR under Patient Instructions for exercises provided prior visit.    Assessment   Good tolerance with TE progression. Patient demonstrated prone cervical retraction with extension for strengthening purposes. No adverse effects.     Patient is making good progress towards established goals.  Pt will continue to benefit from skilled outpatient physical therapy to address the deficits stated in the impairment chart, provide pt/family education and to maximize pt's level of independence in the home and community environment.     Anticipated Barriers for therapy: none  Pt's spiritual, cultural and educational needs considered and pt agreeable to plan of care and goals as stated below:     Goals:   Short term goals: 6 weeks or 10 visits   1.  Pt will demonstratte increased cervical ROM as measured by med ex by 5 degrees from initial test which results in improved  ROM of neck for ease with ADLs and driving. Met, 1/17/20  2. Pt will demonstrate independence with reducing or controlling symptoms with ther ex, movement, or position independently, able to reduce pain 1-2 points on pain scale using strategies taught in therapy. Ongoing  3. Pt will demonstrate increased maximum isometric torque value by 5% when compared to the initial value resulting in improved ability to perform bending, lifting, and carrying activities safely, confidently. Met, 11/17/2020     Long term goals: 13 weeks or 20 visits  1. Pt will demonstratte increased cervical ROM as measured by med ex by 5-8 degrees from initial test which results in functional ROM of neck for ease with ADLs and driving  2. Pt will demonstrate increased isometric torque by 10% from initial test to improve ability to lift and carry, and sustain good posture while performing ADL's  3.Pt will demonstrate reduced pain and improved functional outcomes as reported on the FOTO by reaching a score of CJ = at least 20% but < 40% impaired, limited or restricted or  less in order to demonstrate subjective improvement in pt's condition.    4. Pt will demonstrate independence with reducing or controlling symptoms with ther ex, movement, or position independently, able to reduce pain 2-4 points on pain scale using strategies taught in therapy  5. Pt will demonstrate independence with the HEP at discharge.   6.  Demonstrate left cervical rotation > 60 degrees for driving purposes with no painful limitations. Met, 1/3/2020       Plan   Continue with established Plan of Care towards established PT goals.

## 2020-01-24 ENCOUNTER — TELEPHONE (OUTPATIENT)
Dept: FAMILY MEDICINE | Facility: CLINIC | Age: 45
End: 2020-01-24

## 2020-01-24 ENCOUNTER — TELEPHONE (OUTPATIENT)
Dept: NEUROLOGY | Facility: CLINIC | Age: 45
End: 2020-01-24

## 2020-01-27 ENCOUNTER — TELEPHONE (OUTPATIENT)
Dept: FAMILY MEDICINE | Facility: CLINIC | Age: 45
End: 2020-01-27

## 2020-01-27 DIAGNOSIS — G44.329 CHRONIC POST-TRAUMATIC HEADACHE, NOT INTRACTABLE: ICD-10-CM

## 2020-01-27 RX ORDER — AMITRIPTYLINE HYDROCHLORIDE 25 MG/1
25 TABLET, FILM COATED ORAL NIGHTLY
Qty: 30 TABLET | Refills: 1 | Status: CANCELLED | OUTPATIENT
Start: 2020-01-27 | End: 2021-01-26

## 2020-01-27 NOTE — TELEPHONE ENCOUNTER
----- Message from Evelyn Lance sent at 1/27/2020 11:14 AM CST -----  Contact: Self [phone]  Type: Patient Call Back    Who called:Self    What is the request in detail: pt is calling in regards to questions about getting her medications refilled since she had to reschedule her appt    Can the clinic reply by [facility]? Call back    Would the patient rather a call back or a response via [facility]? Call back    Best call back number:[phone]

## 2020-01-29 RX ORDER — ZOLPIDEM TARTRATE 10 MG/1
TABLET ORAL
Qty: 30 TABLET | OUTPATIENT
Start: 2020-01-29

## 2020-01-29 RX ORDER — ZOLPIDEM TARTRATE 10 MG/1
10 TABLET ORAL NIGHTLY
Qty: 30 TABLET | Refills: 0 | Status: SHIPPED | OUTPATIENT
Start: 2020-01-29 | End: 2020-02-28

## 2020-01-29 NOTE — PROGRESS NOTES
Refill Routing Note     Medication(s) are not appropriate for processing by [facility]:    Medication Outside of Protocol    Appointments  past 12m or future 3m with PCP    Date Provider   Last Visit   Visit date not found Eliz Martinez MD   Next Visit   Visit date not found Eliz Martinez MD           Automatic Epic Protocol Generated Data:    Requested Prescriptions   Pending Prescriptions Disp Refills    zolpidem (AMBIEN) 10 mg Tab [Pharmacy Med Name: ZOLPIDEM 10MG TABLETS] 30 tablet      Sig: TAKE 1 TABLET(10 MG) BY MOUTH EVERY NIGHT       There is no refill protocol information for this order

## 2020-02-03 ENCOUNTER — CLINICAL SUPPORT (OUTPATIENT)
Dept: REHABILITATION | Facility: HOSPITAL | Age: 45
End: 2020-02-03
Attending: ANESTHESIOLOGY
Payer: COMMERCIAL

## 2020-02-03 DIAGNOSIS — M54.2 NECK PAIN, BILATERAL: ICD-10-CM

## 2020-02-03 DIAGNOSIS — M53.82 DECREASED RANGE OF MOTION OF INTERVERTEBRAL DISCS OF CERVICAL SPINE: ICD-10-CM

## 2020-02-03 PROCEDURE — 97110 THERAPEUTIC EXERCISES: CPT | Mod: PO | Performed by: PHYSICAL THERAPIST

## 2020-02-03 NOTE — PROGRESS NOTES
[facility]      Name: Jory Wheeler  Clinic Number: [id number]    Therapy Diagnosis:   Encounter Diagnoses   Name Primary?    Neck pain, bilateral     Decreased range of motion of intervertebral discs of cervical spine      Physician: Joaquin Delaney MD    Visit Date: 2/3/2020  Physician Orders: PT Eval and Treat   Medical Diagnosis from Referral:   Neck pain      Evaluation Date: 10/9/2019  Authorization Period Expiration: 12/31/2019  Plan of Care Expiration: 2/28/2020  Reassessment Due:11/08/2019  Visit # / Visits authorized: 12/ 20     Time In: 1425  Time Out: 1515  Total Billable Time:30 minutes     Precautions: Standard     Pattern of pain determined: 1 REP    Subjective   Jory reported having no pain today but had an episode over the weekend during work related tasks.    Patient reports tolerating previous visit with no adverse effects.  Patient reports their pain to be 0/10 on a 0-10 scale with 0 being no pain and 10 being the worst pain imaginable.  Pain Location: bilateral low cervical region     Work and leisure: Family time  Pt goals: Decrease pain to neck. Increase mobility for the neck     Objective   Baseline Isometric Testing on Med X equipment:  Testing administered by PT  Date of testing: 10/09/2019  ROM 12-90  6-96   Max Peak Torque 193  199   Min Peak Torque 73  166   Flex/Ext Ratio 2.6/1 1.2/1   % below normative data 37 6     Outcomes:  Initial score:   CMS Impairment/Limitation/Restriction for FOTO Neck Survey  Status Limitation G-Code CMS Severity Modifier  Intake 55% 45% Current Status CK - At least 40 percent but less than 60 percent  Predicted 68% 32% Goal Status+ CJ - At least 20 percent but less than 40 percent    Visit 5 score:  Goal:    Treatment    Pt was instructed in and performed the following:   Previous:  REPEATED TEST MOVEMENTS:   Repeated Protraction in Sitting end range pain   Repeated Flexion in Sitting end range pain   Repeated  "Retraction in Sitting  no effect   Repeated Retraction Extension in Sitting no worse      Cervical rotation: cervical left rotation 65 degrees, cervical right rotation 71 degrees (previous)    Jory received therapeutic exercises to develop/improved posture, cardiovascular endurance, muscular endurance, lumbar/cervical ROM, strength and muscular endurance for 40 minutes including the following exercises:  (30 minutes one on one)    UBE 3 minutes fwd/bwd each  Seated: cervical retraction 1/30", f/b 1/10 3", progressed to extension as well  Supine: scalene stretch 3/30" (anterior/middle) (not performed)    Prone: cervical retraction with extension for strengthening DNF 1/10    Manual: cervical retraction with belt, intermittent x 5 minutes.    Peripheral muscle strengthening which included 1 set of 15-20 repetitions at a slow, controlled 10-13 second per rep pace focused on strengthening supporting musculature for improved body mechanics and functional mobility.  Pt and therapist focused on proper form during treatment to ensure optimal strengthening of each targeted muscle group.  Machines were utilized including torso rotation, leg extension, leg curl, chest press, upright row. Tricep extension, bicep curl, leg press, and hip abduction added visit 3    [facility] 2/3/2020   Visit Number 12   VAS Pain Rating 0   Retraction in Sitting 10   Scapular Retraction -   Cervical Extension Seat Pad -   Seat Adjustment -   Top Dead Center -   Counterweight -   Cervical Flexion -   Cervical Extension -   Cervical Peak Torque -   Cervical Weight 183   Repetitions 20   Rating of Perceived Exertion 4       Home Exercises Provided and Patient Education Provided     Education provided:   - Yes  -prone: cervical retraction with extension 2/10 2/day    Written Home Exercises Provided: Patient instructed to cont prior HEP.  Exercises were reviewed and JORY was able to demonstrate them prior to the end of the session.  JORY " demonstrated good  understanding of the education provided.     See EMR under Patient Instructions for exercises provided prior visit.    Assessment   Good tolerance with med-X progression , cervical extension. Good control as well for eccentric lowering. No adverse effects.    Patient is making good progress towards established goals.  Pt will continue to benefit from skilled outpatient physical therapy to address the deficits stated in the impairment chart, provide pt/family education and to maximize pt's level of independence in the home and community environment.     Anticipated Barriers for therapy: none  Pt's spiritual, cultural and educational needs considered and pt agreeable to plan of care and goals as stated below:     Goals:   Short term goals: 6 weeks or 10 visits   1.  Pt will demonstratte increased cervical ROM as measured by med ex by 5 degrees from initial test which results in improved  ROM of neck for ease with ADLs and driving. Met, 1/17/20  2. Pt will demonstrate independence with reducing or controlling symptoms with ther ex, movement, or position independently, able to reduce pain 1-2 points on pain scale using strategies taught in therapy. Ongoing  3. Pt will demonstrate increased maximum isometric torque value by 5% when compared to the initial value resulting in improved ability to perform bending, lifting, and carrying activities safely, confidently. Met, 11/17/2020     Long term goals: 13 weeks or 20 visits  1. Pt will demonstratte increased cervical ROM as measured by med ex by 5-8 degrees from initial test which results in functional ROM of neck for ease with ADLs and driving  2. Pt will demonstrate increased isometric torque by 10% from initial test to improve ability to lift and carry, and sustain good posture while performing ADL's  3.Pt will demonstrate reduced pain and improved functional outcomes as reported on the FOTO by reaching a score of CJ = at least 20% but < 40% impaired,  limited or restricted or less in order to demonstrate subjective improvement in pt's condition.    4. Pt will demonstrate independence with reducing or controlling symptoms with ther ex, movement, or position independently, able to reduce pain 2-4 points on pain scale using strategies taught in therapy  5. Pt will demonstrate independence with the HEP at discharge.   6.  Demonstrate left cervical rotation > 60 degrees for driving purposes with no painful limitations. Met, 1/3/2020       Plan   Continue with established Plan of Care towards established PT goals.

## 2020-02-05 DIAGNOSIS — F41.9 ANXIETY: ICD-10-CM

## 2020-02-05 RX ORDER — CLONAZEPAM 1 MG/1
1 TABLET ORAL 2 TIMES DAILY PRN
Qty: 60 TABLET | Refills: 0 | Status: SHIPPED | OUTPATIENT
Start: 2020-02-05 | End: 2020-03-05 | Stop reason: SDUPTHER

## 2020-02-11 ENCOUNTER — PATIENT MESSAGE (OUTPATIENT)
Dept: FAMILY MEDICINE | Facility: CLINIC | Age: 45
End: 2020-02-11

## 2020-02-11 DIAGNOSIS — R41.840 CONCENTRATION DEFICIT: ICD-10-CM

## 2020-02-11 RX ORDER — METHYLPHENIDATE HYDROCHLORIDE 5 MG/1
5 TABLET ORAL 2 TIMES DAILY WITH MEALS
Qty: 60 TABLET | Refills: 0 | Status: SHIPPED | OUTPATIENT
Start: 2020-02-11 | End: 2020-03-05 | Stop reason: SDUPTHER

## 2020-02-28 ENCOUNTER — CLINICAL SUPPORT (OUTPATIENT)
Dept: REHABILITATION | Facility: HOSPITAL | Age: 45
End: 2020-02-28
Attending: ANESTHESIOLOGY
Payer: COMMERCIAL

## 2020-02-28 DIAGNOSIS — M53.82 DECREASED RANGE OF MOTION OF INTERVERTEBRAL DISCS OF CERVICAL SPINE: ICD-10-CM

## 2020-02-28 DIAGNOSIS — M54.2 NECK PAIN, BILATERAL: ICD-10-CM

## 2020-02-28 PROCEDURE — 97750 PHYSICAL PERFORMANCE TEST: CPT | Mod: PO | Performed by: PHYSICAL THERAPIST

## 2020-02-28 PROCEDURE — 97110 THERAPEUTIC EXERCISES: CPT | Mod: PO | Performed by: PHYSICAL THERAPIST

## 2020-02-28 RX ORDER — ZOLPIDEM TARTRATE 10 MG/1
TABLET ORAL
Qty: 30 TABLET | Refills: 0 | Status: SHIPPED | OUTPATIENT
Start: 2020-02-28 | End: 2020-04-03 | Stop reason: SDUPTHER

## 2020-02-28 NOTE — PROGRESS NOTES
[facility]      Name: Jory Wheeler  Clinic Number: [id number]    Therapy Diagnosis:   Encounter Diagnoses   Name Primary?    Neck pain, bilateral     Decreased range of motion of intervertebral discs of cervical spine      Physician: Joaquin Delaney MD    Visit Date: 2/28/2020  Physician Orders: PT Eval and Treat   Medical Diagnosis from Referral:   Neck pain      Evaluation Date: 10/9/2019  Authorization Period Expiration: 12/31/2019  Plan of Care Expiration: 2/28/2020  Reassessment Due:11/08/2019  Visit # / Visits authorized: 13/ 20     Time In: 1055  Time Out: 1150  Total Billable Time:40 minutes     Precautions: Standard     Pattern of pain determined: 1 REP    Subjective   Jory reported feeling better with no new s/s. Patient report on stopping therapy and will continue with HEP.  0/10 pain noted today.    Patient reports tolerating previous visit with no adverse effects.  Patient reports their pain to be 0/10 on a 0-10 scale with 0 being no pain and 10 being the worst pain imaginable.  Pain Location: bilateral low cervical region     Work and leisure: Family time  Pt goals: Decrease pain to neck. Increase mobility for the neck     Objective   Baseline Isometric Testing on Med X equipment:  Testing administered by PT  Date of testing: 10/09/2019  ROM 12-90  6-96 6-102   Max Peak Torque 193  199 193   Min Peak Torque 73  166 153   Flex/Ext Ratio 2.6/1 1.2/1 1.3/1   % below normative data 37 6 5     Outcomes:  Initial score:   CMS Impairment/Limitation/Restriction for FOTO Neck Survey  Status Limitation G-Code CMS Severity Modifier  Intake 55% 45% Current Status CK - At least 40 percent but less than 60 percent  Predicted 68% 32% Goal Status+ CJ - At least 20 percent but less than 40 percent    Visit 5 score:  Goal:    CMS Impairment/Limitation/Restriction for FOTO Neck Survey  Status Limitation G-Code CMS Severity Modifier  Intake 55% 45%  Predicted 68% 32% Goal Status+  "CJ - At least 20 percent but less than 40 percent  11/4/2019 61% 39%  2/28/2020 72% 28% Current Status CJ - At least 20 percent but less than 40 percent  D/C Status CJ **only report if this is discharge survey  Treatment      Cervical rotation: cervical left rotation 73 degrees, cervical right rotation 71 degrees: No pain  Passively 80+ degrees each    Cervical Motion: no pain     Jory received therapeutic exercises to develop/improved posture, cardiovascular endurance, muscular endurance, lumbar/cervical ROM, strength and muscular endurance for 40 minutes including the following exercises:      UBE 3 minutes fwd/bwd each  Seated: cervical retraction 1/30", f/b 1/10 3", progressed to extension as well  Supine: scalene stretch 3/30" (anterior/middle) (not performed)    Prone: cervical retraction with extension for strengthening DNF 1/10    Manual: cervical retraction with belt, intermittent x 5 minutes.    Peripheral muscle strengthening which included 1 set of 15-20 repetitions at a slow, controlled 10-13 second per rep pace focused on strengthening supporting musculature for improved body mechanics and functional mobility.  Pt and therapist focused on proper form during treatment to ensure optimal strengthening of each targeted muscle group.  Machines were utilized including torso rotation, leg extension, leg curl, chest press, upright row. Tricep extension, bicep curl, leg press, and hip abduction added visit 3          Home Exercises Provided and Patient Education Provided     Education provided:   - Yes  -prone: cervical retraction with extension 2/10 2/day (seated cervical retraction)    Written Home Exercises Provided: Patient instructed to cont prior HEP.  Exercises were reviewed and JORY was able to demonstrate them prior to the end of the session.  JORY demonstrated good  understanding of the education provided.     See EMR under Patient Instructions for exercises provided prior visit.    Assessment "   Majority of goals Met. Improvement with cervical motion. No adverse effects.    Anticipated Barriers for therapy: none  Pt's spiritual, cultural and educational needs considered and pt agreeable to plan of care and goals as stated below:     Goals:   Short term goals: 6 weeks or 10 visits   1.  Pt will demonstratte increased cervical ROM as measured by med ex by 5 degrees from initial test which results in improved  ROM of neck for ease with ADLs and driving. Met, 1/17/20  2. Pt will demonstrate independence with reducing or controlling symptoms with ther ex, movement, or position independently, able to reduce pain 1-2 points on pain scale using strategies taught in therapy. Met, 2/28/20  3. Pt will demonstrate increased maximum isometric torque value by 5% when compared to the initial value resulting in improved ability to perform bending, lifting, and carrying activities safely, confidently. Met, 1/17/2020     Long term goals: 13 weeks or 20 visits  1. Pt will demonstratte increased cervical ROM as measured by med ex by 5-8 degrees from initial test which results in functional ROM of neck for ease with ADLs and driving. Met, 2/28/20  2. Pt will demonstrate increased isometric torque by 10% from initial test to improve ability to lift and carry, and sustain good posture while performing ADL's. improved  3.Pt will demonstrate reduced pain and improved functional outcomes as reported on the FOTO by reaching a score of CJ = at least 20% but < 40% impaired, limited or restricted or less in order to demonstrate subjective improvement in pt's condition.  Met, 2/28/20  4. Pt will demonstrate independence with reducing or controlling symptoms with ther ex, movement, or position independently, able to reduce pain 2-4 points on pain scale using strategies taught in therapy. Met, 2/28/20  5. Pt will demonstrate independence with the HEP at discharge.  Met, 2/28/20  6.  Demonstrate left cervical rotation > 60 degrees for  driving purposes with no painful limitations. Met, 1/3/2020    Plan   Discharge, HEP.

## 2020-03-04 PROBLEM — E03.8 OTHER SPECIFIED HYPOTHYROIDISM: Status: ACTIVE | Noted: 2020-03-04

## 2020-03-05 ENCOUNTER — OFFICE VISIT (OUTPATIENT)
Dept: FAMILY MEDICINE | Facility: CLINIC | Age: 45
End: 2020-03-05
Payer: COMMERCIAL

## 2020-03-05 VITALS
OXYGEN SATURATION: 98 % | DIASTOLIC BLOOD PRESSURE: 82 MMHG | HEART RATE: 87 BPM | HEIGHT: 58 IN | WEIGHT: 158.06 LBS | SYSTOLIC BLOOD PRESSURE: 138 MMHG | TEMPERATURE: 99 F | BODY MASS INDEX: 33.18 KG/M2

## 2020-03-05 DIAGNOSIS — S06.9X0A MILD TRAUMATIC BRAIN INJURY, WITHOUT LOSS OF CONSCIOUSNESS, INITIAL ENCOUNTER: ICD-10-CM

## 2020-03-05 DIAGNOSIS — G44.329 CHRONIC POST-TRAUMATIC HEADACHE, NOT INTRACTABLE: ICD-10-CM

## 2020-03-05 DIAGNOSIS — G43.909 MIGRAINE WITHOUT STATUS MIGRAINOSUS, NOT INTRACTABLE, UNSPECIFIED MIGRAINE TYPE: ICD-10-CM

## 2020-03-05 DIAGNOSIS — R41.840 CONCENTRATION DEFICIT: ICD-10-CM

## 2020-03-05 DIAGNOSIS — F07.81 POST CONCUSSION SYNDROME: Primary | ICD-10-CM

## 2020-03-05 DIAGNOSIS — F32.1 CURRENT MODERATE EPISODE OF MAJOR DEPRESSIVE DISORDER, UNSPECIFIED WHETHER RECURRENT: ICD-10-CM

## 2020-03-05 DIAGNOSIS — F41.9 ANXIETY: ICD-10-CM

## 2020-03-05 DIAGNOSIS — E03.8 OTHER SPECIFIED HYPOTHYROIDISM: ICD-10-CM

## 2020-03-05 PROBLEM — S06.9XAA MILD TRAUMATIC BRAIN INJURY: Status: ACTIVE | Noted: 2020-03-05

## 2020-03-05 PROCEDURE — 99999 PR PBB SHADOW E&M-EST. PATIENT-LVL III: ICD-10-PCS | Mod: PBBFAC,,, | Performed by: INTERNAL MEDICINE

## 2020-03-05 PROCEDURE — 99214 PR OFFICE/OUTPT VISIT, EST, LEVL IV, 30-39 MIN: ICD-10-PCS | Mod: S$GLB,,, | Performed by: INTERNAL MEDICINE

## 2020-03-05 PROCEDURE — 3008F PR BODY MASS INDEX (BMI) DOCUMENTED: ICD-10-PCS | Mod: CPTII,S$GLB,, | Performed by: INTERNAL MEDICINE

## 2020-03-05 PROCEDURE — 99999 PR PBB SHADOW E&M-EST. PATIENT-LVL III: CPT | Mod: PBBFAC,,, | Performed by: INTERNAL MEDICINE

## 2020-03-05 PROCEDURE — 99214 OFFICE O/P EST MOD 30 MIN: CPT | Mod: S$GLB,,, | Performed by: INTERNAL MEDICINE

## 2020-03-05 PROCEDURE — 3008F BODY MASS INDEX DOCD: CPT | Mod: CPTII,S$GLB,, | Performed by: INTERNAL MEDICINE

## 2020-03-05 RX ORDER — METHYLPHENIDATE HYDROCHLORIDE 10 MG/1
TABLET ORAL
Qty: 45 TABLET | Refills: 0 | Status: SHIPPED | OUTPATIENT
Start: 2020-03-05 | End: 2020-03-30

## 2020-03-05 RX ORDER — CLONAZEPAM 1 MG/1
1 TABLET ORAL 2 TIMES DAILY PRN
Qty: 60 TABLET | Refills: 0 | Status: SHIPPED | OUTPATIENT
Start: 2020-03-05 | End: 2020-03-26 | Stop reason: SDUPTHER

## 2020-03-05 RX ORDER — AMITRIPTYLINE HYDROCHLORIDE 25 MG/1
25 TABLET, FILM COATED ORAL NIGHTLY
Qty: 90 TABLET | Refills: 1 | Status: SHIPPED | OUTPATIENT
Start: 2020-03-05 | End: 2020-08-13 | Stop reason: SDUPTHER
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
## 2020-03-12 NOTE — PROGRESS NOTES
Subjective:       Patient ID: Jory Wheeler is a 43 y.o. female.    Chief Complaint: Anxiety and Cough    Here for f/u anxiety and insomnia. Cough for last week or so.       Review of Systems   Constitutional: Negative for chills, fatigue and fever.   Respiratory: Negative for cough, chest tightness and shortness of breath.    Cardiovascular: Negative for chest pain, palpitations and leg swelling.   Endocrine: Negative for cold intolerance and heat intolerance.   Skin: Negative for rash.   Psychiatric/Behavioral: Negative for dysphoric mood. The patient is not nervous/anxious.        Objective:      Physical Exam   Constitutional: She appears well-developed and well-nourished.   HENT:   Head: Normocephalic and atraumatic.   Cardiovascular: Normal rate, regular rhythm and normal heart sounds.   Pulmonary/Chest: Effort normal and breath sounds normal.   Psychiatric: She has a normal mood and affect.   Nursing note and vitals reviewed.      Assessment:       1. Insomnia, unspecified type    2. Shift work sleep disorder    3. Screening for breast cancer    4. Upper respiratory tract infection, unspecified type        Plan:       Insomnia, unspecified type    Shift work sleep disorder    Screening for breast cancer  -     Mammo Digital Screening Bilat; Future; Expected date: 03/07/2019    Upper respiratory tract infection, unspecified type    Other orders  -     clotrimazole-betamethasone 1-0.05% (LOTRISONE) cream; Apply topically 2 (two) times daily.  Dispense: 45 g; Refill: 0    Get labs already ordered.  Prn meds helping still.  Overall doing well.  Call if cough worsens or not improving.  Follow-up in about 4 months (around 7/7/2019), or if symptoms worsen or fail to improve.    
No

## 2020-03-16 ENCOUNTER — PATIENT MESSAGE (OUTPATIENT)
Dept: FAMILY MEDICINE | Facility: CLINIC | Age: 45
End: 2020-03-16

## 2020-03-19 ENCOUNTER — PATIENT MESSAGE (OUTPATIENT)
Dept: FAMILY MEDICINE | Facility: CLINIC | Age: 45
End: 2020-03-19

## 2020-03-24 ENCOUNTER — PATIENT MESSAGE (OUTPATIENT)
Dept: FAMILY MEDICINE | Facility: CLINIC | Age: 45
End: 2020-03-24

## 2020-03-25 ENCOUNTER — TELEPHONE (OUTPATIENT)
Dept: FAMILY MEDICINE | Facility: CLINIC | Age: 45
End: 2020-03-25

## 2020-03-25 NOTE — TELEPHONE ENCOUNTER
----- Message from Rahda Low sent at 3/25/2020  4:39 PM CDT -----  Contact: patient  ,Type: Needs Medical Advice    Who Called:  Patient  Best Call Back Number:[phone] (home)   Additional Information: Wants to know if she can temp increase the Clonopin please call her to advise please call to advise

## 2020-03-26 ENCOUNTER — PATIENT MESSAGE (OUTPATIENT)
Dept: FAMILY MEDICINE | Facility: CLINIC | Age: 45
End: 2020-03-26

## 2020-03-26 DIAGNOSIS — F41.9 ANXIETY: ICD-10-CM

## 2020-03-26 RX ORDER — CLONAZEPAM 2 MG/1
2 TABLET ORAL 2 TIMES DAILY PRN
Qty: 60 TABLET | Refills: 0 | Status: SHIPPED | OUTPATIENT
Start: 2020-03-26 | End: 2020-04-27 | Stop reason: SDUPTHER

## 2020-03-26 NOTE — TELEPHONE ENCOUNTER
Please have her schedule a video visit so we can discuss if she has the availability.  If not she would need to the let me know what dose she wants to increase to and how frequently she wants to take it

## 2020-03-27 ENCOUNTER — PATIENT MESSAGE (OUTPATIENT)
Dept: FAMILY MEDICINE | Facility: CLINIC | Age: 45
End: 2020-03-27

## 2020-03-30 ENCOUNTER — PATIENT MESSAGE (OUTPATIENT)
Dept: FAMILY MEDICINE | Facility: CLINIC | Age: 45
End: 2020-03-30

## 2020-03-30 ENCOUNTER — DOCUMENTATION ONLY (OUTPATIENT)
Dept: FAMILY MEDICINE | Facility: CLINIC | Age: 45
End: 2020-03-30

## 2020-03-30 ENCOUNTER — OFFICE VISIT (OUTPATIENT)
Dept: FAMILY MEDICINE | Facility: CLINIC | Age: 45
End: 2020-03-30
Payer: COMMERCIAL

## 2020-03-30 DIAGNOSIS — G47.26 SHIFT WORK SLEEP DISORDER: ICD-10-CM

## 2020-03-30 DIAGNOSIS — F41.9 ANXIETY AND DEPRESSION: ICD-10-CM

## 2020-03-30 DIAGNOSIS — F32.A ANXIETY AND DEPRESSION: ICD-10-CM

## 2020-03-30 DIAGNOSIS — E03.8 OTHER SPECIFIED HYPOTHYROIDISM: ICD-10-CM

## 2020-03-30 DIAGNOSIS — F41.9 ANXIETY: Primary | ICD-10-CM

## 2020-03-30 PROCEDURE — 99214 PR OFFICE/OUTPT VISIT, EST, LEVL IV, 30-39 MIN: ICD-10-PCS | Mod: 95,,, | Performed by: INTERNAL MEDICINE

## 2020-03-30 PROCEDURE — 99214 OFFICE O/P EST MOD 30 MIN: CPT | Mod: 95,,, | Performed by: INTERNAL MEDICINE

## 2020-03-30 RX ORDER — DEXTROAMPHETAMINE SACCHARATE, AMPHETAMINE ASPARTATE MONOHYDRATE, DEXTROAMPHETAMINE SULFATE AND AMPHETAMINE SULFATE 1.25; 1.25; 1.25; 1.25 MG/1; MG/1; MG/1; MG/1
5 CAPSULE, EXTENDED RELEASE ORAL DAILY
Qty: 30 CAPSULE | Refills: 0 | Status: SHIPPED | OUTPATIENT
Start: 2020-03-30 | End: 2020-03-31

## 2020-03-30 NOTE — Clinical Note
TSH, CBC, CMP, B12, vitamin-D in May.Please discuss that I changed my mind on Adderall, will try Adderall sustained release 5 mg once daily as it showed that it was preferred level 2 for insurance.  If it is more expensive or the same cost for Adderall b.i.d. we can change over.  Ask her to discuss with her pharmacist about cost.
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
## 2020-03-31 ENCOUNTER — TELEPHONE (OUTPATIENT)
Dept: FAMILY MEDICINE | Facility: CLINIC | Age: 45
End: 2020-03-31

## 2020-03-31 ENCOUNTER — PATIENT MESSAGE (OUTPATIENT)
Dept: FAMILY MEDICINE | Facility: CLINIC | Age: 45
End: 2020-03-31

## 2020-03-31 DIAGNOSIS — G47.26 SHIFT WORK SLEEP DISORDER: Primary | ICD-10-CM

## 2020-03-31 DIAGNOSIS — R41.840 CONCENTRATION DEFICIT: ICD-10-CM

## 2020-03-31 RX ORDER — DEXTROAMPHETAMINE SACCHARATE, AMPHETAMINE ASPARTATE, DEXTROAMPHETAMINE SULFATE AND AMPHETAMINE SULFATE 1.25; 1.25; 1.25; 1.25 MG/1; MG/1; MG/1; MG/1
5 TABLET ORAL 2 TIMES DAILY
Qty: 60 TABLET | Refills: 0 | Status: SHIPPED | OUTPATIENT
Start: 2020-03-31 | End: 2020-04-03

## 2020-03-31 NOTE — TELEPHONE ENCOUNTER
Patient states she will have labs done at another facility, she also states understanding about Adderall medications

## 2020-03-31 NOTE — TELEPHONE ENCOUNTER
Patient would like to know when the new adderall prescription for 5mg sustained release be called into the pharmacy please advise, and advise if we should call pharmacy and cancelled adderall XR that was called into pharmacy on yesterday.

## 2020-04-03 ENCOUNTER — PATIENT MESSAGE (OUTPATIENT)
Dept: FAMILY MEDICINE | Facility: CLINIC | Age: 45
End: 2020-04-03

## 2020-04-03 DIAGNOSIS — R41.840 CONCENTRATION DEFICIT: ICD-10-CM

## 2020-04-03 DIAGNOSIS — G47.26 SHIFT WORK SLEEP DISORDER: ICD-10-CM

## 2020-04-03 RX ORDER — TRAZODONE HYDROCHLORIDE 50 MG/1
TABLET ORAL
Qty: 90 TABLET | Refills: 1 | OUTPATIENT
Start: 2020-04-03

## 2020-04-03 RX ORDER — ZOLPIDEM TARTRATE 10 MG/1
10 TABLET ORAL NIGHTLY
Qty: 30 TABLET | Refills: 0 | Status: SHIPPED | OUTPATIENT
Start: 2020-04-03 | End: 2020-04-30 | Stop reason: SDUPTHER

## 2020-04-03 RX ORDER — DEXTROAMPHETAMINE SACCHARATE, AMPHETAMINE ASPARTATE, DEXTROAMPHETAMINE SULFATE AND AMPHETAMINE SULFATE 2.5; 2.5; 2.5; 2.5 MG/1; MG/1; MG/1; MG/1
10 TABLET ORAL 2 TIMES DAILY
Qty: 60 TABLET | Refills: 0 | Status: SHIPPED | OUTPATIENT
Start: 2020-04-03 | End: 2020-05-04 | Stop reason: SDUPTHER

## 2020-04-03 RX ORDER — DEXTROAMPHETAMINE SACCHARATE, AMPHETAMINE ASPARTATE, DEXTROAMPHETAMINE SULFATE AND AMPHETAMINE SULFATE 1.25; 1.25; 1.25; 1.25 MG/1; MG/1; MG/1; MG/1
10 TABLET ORAL 2 TIMES DAILY
Qty: 60 TABLET | Refills: 0 | Status: CANCELLED | OUTPATIENT
Start: 2020-04-03

## 2020-04-07 ENCOUNTER — PATIENT MESSAGE (OUTPATIENT)
Dept: FAMILY MEDICINE | Facility: CLINIC | Age: 45
End: 2020-04-07

## 2020-04-13 DIAGNOSIS — F32.1 CURRENT MODERATE EPISODE OF MAJOR DEPRESSIVE DISORDER, UNSPECIFIED WHETHER RECURRENT: ICD-10-CM

## 2020-04-13 RX ORDER — CITALOPRAM 40 MG/1
TABLET, FILM COATED ORAL
Qty: 90 TABLET | Refills: 1 | Status: SHIPPED | OUTPATIENT
Start: 2020-04-13 | End: 2020-10-09

## 2020-04-27 DIAGNOSIS — F41.9 ANXIETY: ICD-10-CM

## 2020-04-27 RX ORDER — CLONAZEPAM 2 MG/1
2 TABLET ORAL 2 TIMES DAILY PRN
Qty: 60 TABLET | Refills: 0 | Status: SHIPPED | OUTPATIENT
Start: 2020-04-27 | End: 2020-05-27 | Stop reason: SDUPTHER

## 2020-04-27 NOTE — TELEPHONE ENCOUNTER
Will refill med.  Please ask patient if she has thought about cutting back on clonazepam dose and frequency.  Can discuss at next visit.

## 2020-04-30 RX ORDER — ZOLPIDEM TARTRATE 10 MG/1
10 TABLET ORAL NIGHTLY
Qty: 30 TABLET | Refills: 0 | Status: SHIPPED | OUTPATIENT
Start: 2020-04-30 | End: 2020-05-05

## 2020-05-04 ENCOUNTER — PATIENT MESSAGE (OUTPATIENT)
Dept: FAMILY MEDICINE | Facility: CLINIC | Age: 45
End: 2020-05-04

## 2020-05-04 DIAGNOSIS — G47.26 SHIFT WORK SLEEP DISORDER: ICD-10-CM

## 2020-05-04 DIAGNOSIS — R41.840 CONCENTRATION DEFICIT: ICD-10-CM

## 2020-05-04 RX ORDER — DEXTROAMPHETAMINE SACCHARATE, AMPHETAMINE ASPARTATE, DEXTROAMPHETAMINE SULFATE AND AMPHETAMINE SULFATE 2.5; 2.5; 2.5; 2.5 MG/1; MG/1; MG/1; MG/1
10 TABLET ORAL 2 TIMES DAILY
Qty: 60 TABLET | Refills: 0 | Status: SHIPPED | OUTPATIENT
Start: 2020-05-04 | End: 2020-05-31 | Stop reason: SDUPTHER

## 2020-05-05 RX ORDER — ZOLPIDEM TARTRATE 10 MG/1
TABLET ORAL
Qty: 30 TABLET | Refills: 0 | Status: SHIPPED | OUTPATIENT
Start: 2020-05-05 | End: 2020-05-27 | Stop reason: SDUPTHER

## 2020-05-22 ENCOUNTER — TELEPHONE (OUTPATIENT)
Dept: FAMILY MEDICINE | Facility: CLINIC | Age: 45
End: 2020-05-22

## 2020-05-22 NOTE — TELEPHONE ENCOUNTER
----- Message from Jhonny Blas sent at 5/22/2020  1:28 PM CDT -----  Contact: pt  Pt called wanted to ask about her lab orders.     Pt wanted to make sure if the orders were sent to [facility]    Pt can be reached at [phone]

## 2020-05-27 ENCOUNTER — PATIENT MESSAGE (OUTPATIENT)
Dept: FAMILY MEDICINE | Facility: CLINIC | Age: 45
End: 2020-05-27

## 2020-05-27 DIAGNOSIS — F41.9 ANXIETY: ICD-10-CM

## 2020-05-28 RX ORDER — ZOLPIDEM TARTRATE 10 MG/1
10 TABLET ORAL NIGHTLY
Qty: 30 TABLET | Refills: 0 | Status: SHIPPED | OUTPATIENT
Start: 2020-05-28 | End: 2020-06-25 | Stop reason: SDUPTHER

## 2020-05-28 RX ORDER — CLONAZEPAM 2 MG/1
2 TABLET ORAL 2 TIMES DAILY PRN
Qty: 60 TABLET | Refills: 0 | Status: SHIPPED | OUTPATIENT
Start: 2020-05-28 | End: 2020-06-08 | Stop reason: SDUPTHER

## 2020-05-31 DIAGNOSIS — R41.840 CONCENTRATION DEFICIT: ICD-10-CM

## 2020-05-31 DIAGNOSIS — G47.26 SHIFT WORK SLEEP DISORDER: ICD-10-CM

## 2020-06-01 RX ORDER — DEXTROAMPHETAMINE SACCHARATE, AMPHETAMINE ASPARTATE, DEXTROAMPHETAMINE SULFATE AND AMPHETAMINE SULFATE 2.5; 2.5; 2.5; 2.5 MG/1; MG/1; MG/1; MG/1
10 TABLET ORAL 2 TIMES DAILY
Qty: 60 TABLET | Refills: 0 | Status: SHIPPED | OUTPATIENT
Start: 2020-06-01 | End: 2020-06-08

## 2020-06-08 ENCOUNTER — OFFICE VISIT (OUTPATIENT)
Dept: FAMILY MEDICINE | Facility: CLINIC | Age: 45
End: 2020-06-08
Payer: COMMERCIAL

## 2020-06-08 VITALS
SYSTOLIC BLOOD PRESSURE: 124 MMHG | HEART RATE: 90 BPM | WEIGHT: 158.31 LBS | TEMPERATURE: 99 F | HEIGHT: 58 IN | BODY MASS INDEX: 33.23 KG/M2 | DIASTOLIC BLOOD PRESSURE: 74 MMHG | OXYGEN SATURATION: 97 %

## 2020-06-08 DIAGNOSIS — F41.9 ANXIETY: ICD-10-CM

## 2020-06-08 DIAGNOSIS — R41.840 CONCENTRATION DEFICIT: ICD-10-CM

## 2020-06-08 DIAGNOSIS — G47.26 SHIFT WORK SLEEP DISORDER: ICD-10-CM

## 2020-06-08 PROCEDURE — 99999 PR PBB SHADOW E&M-EST. PATIENT-LVL III: CPT | Mod: PBBFAC,,, | Performed by: INTERNAL MEDICINE

## 2020-06-08 PROCEDURE — 3008F BODY MASS INDEX DOCD: CPT | Mod: CPTII,S$GLB,, | Performed by: INTERNAL MEDICINE

## 2020-06-08 PROCEDURE — 99214 OFFICE O/P EST MOD 30 MIN: CPT | Mod: S$GLB,,, | Performed by: INTERNAL MEDICINE

## 2020-06-08 PROCEDURE — 99999 PR PBB SHADOW E&M-EST. PATIENT-LVL III: ICD-10-PCS | Mod: PBBFAC,,, | Performed by: INTERNAL MEDICINE

## 2020-06-08 PROCEDURE — 99214 PR OFFICE/OUTPT VISIT, EST, LEVL IV, 30-39 MIN: ICD-10-PCS | Mod: S$GLB,,, | Performed by: INTERNAL MEDICINE

## 2020-06-08 PROCEDURE — 3008F PR BODY MASS INDEX (BMI) DOCUMENTED: ICD-10-PCS | Mod: CPTII,S$GLB,, | Performed by: INTERNAL MEDICINE

## 2020-06-08 RX ORDER — DEXTROAMPHETAMINE SACCHARATE, AMPHETAMINE ASPARTATE, DEXTROAMPHETAMINE SULFATE AND AMPHETAMINE SULFATE 3.75; 3.75; 3.75; 3.75 MG/1; MG/1; MG/1; MG/1
15 TABLET ORAL 2 TIMES DAILY
Qty: 60 TABLET | Refills: 0 | Status: SHIPPED | OUTPATIENT
Start: 2020-06-08 | End: 2020-07-10 | Stop reason: SDUPTHER

## 2020-06-08 RX ORDER — CLONAZEPAM 1 MG/1
1 TABLET ORAL 2 TIMES DAILY PRN
Qty: 60 TABLET | Refills: 0 | Status: SHIPPED | OUTPATIENT
Start: 2020-06-08 | End: 2020-07-07

## 2020-06-08 NOTE — PROGRESS NOTES
Subjective:       Patient ID: Jory Wheeler is a 44 y.o. female.    Chief Complaint: Follow-up    Social Hx:  From [place]. Now living in [place]. . She has 2 children. 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      HPI:  Past medical history includes post concussive syndrome, anxiety depression, chronic insomnia, hypothyroidism, HRT, shift work sleep disorder and decreased concentration.    She did go down on amitriptyline for a while, but now back full dose. Taking celexa. She thinks she can go back down on clonapin. The adderall is helping but she thinks she is getting used to it. We discussed expectations. She does not think she is falling behind at work. She would like to increase adderall to 15 mg BID.     A/P:  1.  Anxiety depression: Stable, decrease clonapin back to 1 mg BID  2.  Chronic insomnia: controlled on ambien   3.  Hypothyroidism: labs pending   4.  shift work sleep disorder: controlled on adderall, trial increased dose  5.  Class 1 obesity: discussed increasing exercise and decreased carbs.     Current Outpatient Medications on File Prior to Visit   Medication Sig Dispense Refill    amitriptyline (ELAVIL) 25 MG tablet Take 1 tablet (25 mg total) by mouth every evening. 90 tablet 1    azelastine (OPTIVAR) 0.05 % ophthalmic solution   0    citalopram (CELEXA) 40 MG tablet TAKE 1 TABLET(40 MG) BY MOUTH EVERY DAY 90 tablet 1    clotrimazole-betamethasone 1-0.05% (LOTRISONE) cream Apply topically 2 (two) times daily. 45 g 0    estradiol (ESTRACE) 1 MG tablet       ibuprofen (ADVIL,MOTRIN) 600 MG tablet Take 1 tablet (600 mg total) by mouth every 6 (six) hours as needed for Pain. 40 tablet 0    levothyroxine (SYNTHROID) 50 MCG tablet Take 50 mcg by mouth once daily.      zolpidem (AMBIEN) 10 mg Tab Take 1 tablet (10 mg total) by mouth every evening. 30 tablet 0    [DISCONTINUED] clonazePAM (KLONOPIN) 2 MG Tab Take 1 tablet (2 mg total) by mouth 2  (two) times daily as needed. 60 tablet 0    [DISCONTINUED] dextroamphetamine-amphetamine (ADDERALL) 10 mg Tab Take 1 tablet (10 mg total) by mouth 2 (two) times daily. 60 tablet 0    acyclovir (ZOVIRAX) 400 MG tablet Take 400 mg by mouth as needed.      hydrOXYzine HCl (ATARAX) 25 MG tablet TK 1 T PO BID PRF ITCHING  0    [DISCONTINUED] ondansetron (ZOFRAN) 4 MG tablet Take 1 tablet (4 mg total) by mouth 2 (two) times daily. (Patient not taking: Reported on 3/5/2020) 20 tablet 0    [DISCONTINUED] PROCTOZONE-HC 2.5 % rectal cream APPLY RECTALLY TID FOR 2 WEEKS UTD  1    [DISCONTINUED] sumatriptan (IMITREX) 50 MG tablet Take 1 tablet at the beginning of headache symptoms.  Can repeat 1 tablet in 1-2 hours if symptoms have not resolved.  Do not take more than 2 tablets per day. 10 tablet 2     No current facility-administered medications on file prior to visit.      I personally reviewed past medical, family and social history.  Review of Systems   Constitutional: Negative for activity change and fever.   HENT: Negative for sore throat and trouble swallowing.    Eyes: Negative for pain and visual disturbance.   Respiratory: Negative for cough, shortness of breath and wheezing.    Cardiovascular: Negative for chest pain, palpitations and leg swelling.   Gastrointestinal: Negative for abdominal pain, blood in stool, diarrhea, nausea and vomiting.   Endocrine: Negative for cold intolerance and polyuria.   Genitourinary: Negative for decreased urine volume and dysuria.   Musculoskeletal: Negative for gait problem and neck pain.   Skin: Negative for rash.   Neurological: Negative for dizziness, syncope and light-headedness.   Psychiatric/Behavioral: Negative for dysphoric mood. The patient is not nervous/anxious.          Objective:     Vitals:    06/08/20 1400   BP: 124/74   Pulse: 90   Temp: 98.5 °F (36.9 °C)        Physical Exam   Constitutional: She is oriented to person, place, and time. She appears  well-developed. No distress.   HENT:   Head: Normocephalic and atraumatic.   Eyes: Pupils are equal, round, and reactive to light. EOM are normal.   Neck: Neck supple. No thyromegaly present.   Cardiovascular: Normal rate, regular rhythm and normal heart sounds. Exam reveals no gallop and no friction rub.   No murmur heard.  Pulmonary/Chest: Effort normal and breath sounds normal. No respiratory distress. She has no wheezes.   Abdominal: Soft. Bowel sounds are normal. There is no tenderness.   Musculoskeletal: She exhibits no edema.   Neurological: She is alert and oriented to person, place, and time. No cranial nerve deficit.   Skin: Skin is warm. No rash noted.   Psychiatric: She has a normal mood and affect. Her behavior is normal.         Assessment/Plan   Jory was seen today for follow-up.    Diagnoses and all orders for this visit:    Shift work sleep disorder  -     dextroamphetamine-amphetamine (ADDERALL) 15 mg tablet; Take 1 tablet (15 mg total) by mouth 2 (two) times a day.    Concentration deficit  -     dextroamphetamine-amphetamine (ADDERALL) 15 mg tablet; Take 1 tablet (15 mg total) by mouth 2 (two) times a day.    Anxiety  -     clonazePAM (KLONOPIN) 1 MG tablet; Take 1 tablet (1 mg total) by mouth 2 (two) times daily as needed.

## 2020-06-09 ENCOUNTER — PATIENT MESSAGE (OUTPATIENT)
Dept: FAMILY MEDICINE | Facility: CLINIC | Age: 45
End: 2020-06-09

## 2020-06-09 DIAGNOSIS — R73.9 HYPERGLYCEMIA: Primary | ICD-10-CM

## 2020-06-09 LAB
ALBUMIN SERPL-MCNC: 4.2 G/DL (ref 3.6–5.1)
ALBUMIN/GLOB SERPL: 1.8 (CALC) (ref 1–2.5)
ALP SERPL-CCNC: 49 U/L (ref 31–125)
ALT SERPL-CCNC: 13 U/L (ref 6–29)
AST SERPL-CCNC: 17 U/L (ref 10–30)
BASOPHILS # BLD AUTO: 19 CELLS/UL (ref 0–200)
BASOPHILS NFR BLD AUTO: 0.3 %
BILIRUB SERPL-MCNC: 0.5 MG/DL (ref 0.2–1.2)
BUN SERPL-MCNC: 18 MG/DL (ref 7–25)
BUN/CREAT SERPL: ABNORMAL (CALC) (ref 6–22)
CALCIUM SERPL-MCNC: 9.3 MG/DL (ref 8.6–10.2)
CHLORIDE SERPL-SCNC: 103 MMOL/L (ref 98–110)
CO2 SERPL-SCNC: 28 MMOL/L (ref 20–32)
CREAT SERPL-MCNC: 0.7 MG/DL (ref 0.5–1.1)
EOSINOPHIL # BLD AUTO: 70 CELLS/UL (ref 15–500)
EOSINOPHIL NFR BLD AUTO: 1.1 %
ERYTHROCYTE [DISTWIDTH] IN BLOOD BY AUTOMATED COUNT: 12.4 % (ref 11–15)
GFRSERPLBLD MDRD-ARVRAT: 107 ML/MIN/1.73M2
GLOBULIN SER CALC-MCNC: 2.4 G/DL (CALC) (ref 1.9–3.7)
GLUCOSE SERPL-MCNC: 127 MG/DL (ref 65–99)
HCT VFR BLD AUTO: 38.5 % (ref 35–45)
HGB BLD-MCNC: 12.6 G/DL (ref 11.7–15.5)
LYMPHOCYTES # BLD AUTO: 2336 CELLS/UL (ref 850–3900)
LYMPHOCYTES NFR BLD AUTO: 36.5 %
MCH RBC QN AUTO: 30.8 PG (ref 27–33)
MCHC RBC AUTO-ENTMCNC: 32.7 G/DL (ref 32–36)
MCV RBC AUTO: 94.1 FL (ref 80–100)
MONOCYTES # BLD AUTO: 339 CELLS/UL (ref 200–950)
MONOCYTES NFR BLD AUTO: 5.3 %
NEUTROPHILS # BLD AUTO: 3635 CELLS/UL (ref 1500–7800)
NEUTROPHILS NFR BLD AUTO: 56.8 %
PLATELET # BLD AUTO: 324 THOUSAND/UL (ref 140–400)
PMV BLD REES-ECKER: 9.7 FL (ref 7.5–12.5)
POTASSIUM SERPL-SCNC: 3.9 MMOL/L (ref 3.5–5.3)
PROT SERPL-MCNC: 6.6 G/DL (ref 6.1–8.1)
RBC # BLD AUTO: 4.09 MILLION/UL (ref 3.8–5.1)
SODIUM SERPL-SCNC: 138 MMOL/L (ref 135–146)
TSH SERPL-ACNC: 1.35 MIU/L
WBC # BLD AUTO: 6.4 THOUSAND/UL (ref 3.8–10.8)

## 2020-06-25 ENCOUNTER — PATIENT MESSAGE (OUTPATIENT)
Dept: FAMILY MEDICINE | Facility: CLINIC | Age: 45
End: 2020-06-25

## 2020-06-25 RX ORDER — ONDANSETRON 4 MG/1
4 TABLET, ORALLY DISINTEGRATING ORAL EVERY 8 HOURS PRN
Qty: 60 TABLET | Refills: 0 | Status: SHIPPED | OUTPATIENT
Start: 2020-06-25 | End: 2020-12-01 | Stop reason: SDUPTHER

## 2020-06-25 RX ORDER — ZOLPIDEM TARTRATE 10 MG/1
10 TABLET ORAL NIGHTLY
Qty: 30 TABLET | Refills: 0 | Status: SHIPPED | OUTPATIENT
Start: 2020-06-25 | End: 2020-07-23

## 2020-07-03 ENCOUNTER — PATIENT MESSAGE (OUTPATIENT)
Dept: FAMILY MEDICINE | Facility: CLINIC | Age: 45
End: 2020-07-03

## 2020-07-03 DIAGNOSIS — G43.909 MIGRAINE WITHOUT STATUS MIGRAINOSUS, NOT INTRACTABLE, UNSPECIFIED MIGRAINE TYPE: Primary | ICD-10-CM

## 2020-07-05 RX ORDER — SUMATRIPTAN 50 MG/1
TABLET, FILM COATED ORAL
Qty: 10 TABLET | Refills: 5 | Status: SHIPPED | OUTPATIENT
Start: 2020-07-05 | End: 2020-12-01 | Stop reason: SDUPTHER

## 2020-07-07 DIAGNOSIS — F41.9 ANXIETY: ICD-10-CM

## 2020-07-08 RX ORDER — CLONAZEPAM 1 MG/1
1 TABLET ORAL 2 TIMES DAILY PRN
Qty: 60 TABLET | Refills: 0 | OUTPATIENT
Start: 2020-07-08

## 2020-07-10 ENCOUNTER — PATIENT MESSAGE (OUTPATIENT)
Dept: FAMILY MEDICINE | Facility: CLINIC | Age: 45
End: 2020-07-10

## 2020-07-10 DIAGNOSIS — G47.26 SHIFT WORK SLEEP DISORDER: ICD-10-CM

## 2020-07-10 DIAGNOSIS — R41.840 CONCENTRATION DEFICIT: ICD-10-CM

## 2020-07-10 RX ORDER — DEXTROAMPHETAMINE SACCHARATE, AMPHETAMINE ASPARTATE, DEXTROAMPHETAMINE SULFATE AND AMPHETAMINE SULFATE 3.75; 3.75; 3.75; 3.75 MG/1; MG/1; MG/1; MG/1
15 TABLET ORAL 2 TIMES DAILY
Qty: 60 TABLET | Refills: 0 | Status: SHIPPED | OUTPATIENT
Start: 2020-07-10 | End: 2020-08-06 | Stop reason: SDUPTHER

## 2020-07-23 RX ORDER — ZOLPIDEM TARTRATE 10 MG/1
10 TABLET ORAL NIGHTLY
Qty: 30 TABLET | Refills: 0 | OUTPATIENT
Start: 2020-07-23

## 2020-08-11 DIAGNOSIS — R41.840 CONCENTRATION DEFICIT: ICD-10-CM

## 2020-08-11 DIAGNOSIS — G47.26 SHIFT WORK SLEEP DISORDER: ICD-10-CM

## 2020-08-11 RX ORDER — DEXTROAMPHETAMINE SACCHARATE, AMPHETAMINE ASPARTATE, DEXTROAMPHETAMINE SULFATE AND AMPHETAMINE SULFATE 3.75; 3.75; 3.75; 3.75 MG/1; MG/1; MG/1; MG/1
15 TABLET ORAL 2 TIMES DAILY
Qty: 60 TABLET | Refills: 0 | Status: SHIPPED | OUTPATIENT
Start: 2020-08-11 | End: 2020-09-14 | Stop reason: SDUPTHER

## 2020-08-14 ENCOUNTER — OFFICE VISIT (OUTPATIENT)
Dept: FAMILY MEDICINE | Facility: CLINIC | Age: 45
End: 2020-08-14
Payer: COMMERCIAL

## 2020-08-14 VITALS
HEIGHT: 58 IN | SYSTOLIC BLOOD PRESSURE: 128 MMHG | HEART RATE: 94 BPM | BODY MASS INDEX: 33.51 KG/M2 | DIASTOLIC BLOOD PRESSURE: 76 MMHG | TEMPERATURE: 98 F | OXYGEN SATURATION: 97 % | WEIGHT: 159.63 LBS

## 2020-08-14 DIAGNOSIS — Z11.59 ENCOUNTER FOR HEPATITIS C SCREENING TEST FOR LOW RISK PATIENT: Primary | ICD-10-CM

## 2020-08-14 DIAGNOSIS — F41.9 ANXIETY: ICD-10-CM

## 2020-08-14 DIAGNOSIS — K58.0 IRRITABLE BOWEL SYNDROME WITH DIARRHEA: ICD-10-CM

## 2020-08-14 DIAGNOSIS — G47.26 SHIFT WORK SLEEP DISORDER: ICD-10-CM

## 2020-08-14 DIAGNOSIS — Z80.0 FAMILY HISTORY OF COLON CANCER IN FATHER: ICD-10-CM

## 2020-08-14 DIAGNOSIS — F32.1 CURRENT MODERATE EPISODE OF MAJOR DEPRESSIVE DISORDER, UNSPECIFIED WHETHER RECURRENT: ICD-10-CM

## 2020-08-14 DIAGNOSIS — G47.00 INSOMNIA, UNSPECIFIED TYPE: ICD-10-CM

## 2020-08-14 DIAGNOSIS — E03.8 OTHER SPECIFIED HYPOTHYROIDISM: ICD-10-CM

## 2020-08-14 PROCEDURE — 3008F BODY MASS INDEX DOCD: CPT | Mod: CPTII,S$GLB,, | Performed by: INTERNAL MEDICINE

## 2020-08-14 PROCEDURE — 99214 OFFICE O/P EST MOD 30 MIN: CPT | Mod: S$GLB,,, | Performed by: INTERNAL MEDICINE

## 2020-08-14 PROCEDURE — 99999 PR PBB SHADOW E&M-EST. PATIENT-LVL IV: ICD-10-PCS | Mod: PBBFAC,,, | Performed by: INTERNAL MEDICINE

## 2020-08-14 PROCEDURE — 3008F PR BODY MASS INDEX (BMI) DOCUMENTED: ICD-10-PCS | Mod: CPTII,S$GLB,, | Performed by: INTERNAL MEDICINE

## 2020-08-14 PROCEDURE — 99999 PR PBB SHADOW E&M-EST. PATIENT-LVL IV: CPT | Mod: PBBFAC,,, | Performed by: INTERNAL MEDICINE

## 2020-08-14 PROCEDURE — 99214 PR OFFICE/OUTPT VISIT, EST, LEVL IV, 30-39 MIN: ICD-10-PCS | Mod: S$GLB,,, | Performed by: INTERNAL MEDICINE

## 2020-08-14 RX ORDER — ZOLPIDEM TARTRATE 10 MG/1
TABLET ORAL
Qty: 30 TABLET | Refills: 0 | Status: SHIPPED | OUTPATIENT
Start: 2020-08-14 | End: 2020-08-22 | Stop reason: SDUPTHER

## 2020-08-14 RX ORDER — DICYCLOMINE HYDROCHLORIDE 10 MG/1
10 CAPSULE ORAL
Qty: 120 CAPSULE | Refills: 0 | Status: SHIPPED | OUTPATIENT
Start: 2020-08-14 | End: 2020-09-13

## 2020-08-14 NOTE — PROGRESS NOTES
Subjective:       Patient ID: Jory Wheeler is a 44 y.o. female.    Chief Complaint: Anxiety and shift work sleep disorder      Social Hx:  From [place]. Now living in [place]. . She has 2 children. 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     HPI:  Here for f/u. They are planning on traveling for now.     PMH and A/P:   Anxiety/depression: celexa,  P.r.n. Klonopin  -stable   Chronic insomnia:  Ambien  -stable   Headaches/ post concussive syndrome: amitriptyline  -stable on prn motrin and tylenol.   Hypothyroidism:  -controlled March 2020  Shift work sleep disorder:  Adderall  -stable   Class 1 obesity:  -Wt stable.   -discussed exercise.   Health maintenance:  -hep C screening  -mammogram  Fhx of colon cancer and chronic diarrhea:  -cscope.   IBS?  -will trial bentyl    Cancel A1c.   B12, vitamin-D, CBC, CMP, hepatitis-C March 2021  Current Outpatient Medications on File Prior to Visit   Medication Sig Dispense Refill    amitriptyline (ELAVIL) 25 MG tablet Take 1 tablet (25 mg total) by mouth every evening. 90 tablet 1    azelastine (OPTIVAR) 0.05 % ophthalmic solution   0    citalopram (CELEXA) 40 MG tablet TAKE 1 TABLET(40 MG) BY MOUTH EVERY DAY 90 tablet 1    clonazePAM (KLONOPIN) 1 MG tablet Take 1 tablet (1 mg total) by mouth 2 (two) times daily as needed for Anxiety. 60 tablet 0    clotrimazole-betamethasone 1-0.05% (LOTRISONE) cream Apply topically 2 (two) times daily. 45 g 0    dextroamphetamine-amphetamine (ADDERALL) 15 mg tablet Take 1 tablet (15 mg total) by mouth 2 (two) times a day. 60 tablet 0    estradiol (ESTRACE) 1 MG tablet       hydrOXYzine HCl (ATARAX) 25 MG tablet TK 1 T PO BID PRF ITCHING  0    ibuprofen (ADVIL,MOTRIN) 600 MG tablet Take 1 tablet (600 mg total) by mouth every 6 (six) hours as needed for Pain. 40 tablet 0    levothyroxine (SYNTHROID) 50 MCG tablet Take 50 mcg by mouth once daily.      ondansetron (ZOFRAN-ODT) 4 MG TbDL Take 1 tablet (4  mg total) by mouth every 8 (eight) hours as needed. 60 tablet 0    sumatriptan (IMITREX) 50 MG tablet Take 1 tablet at the beginning of headache symptoms.  Can repeat 1 tablet in 1-2 hours if symptoms have not resolved.  Do not take more than 2 tablets per day. 10 tablet 5    [DISCONTINUED] zolpidem (AMBIEN) 10 mg Tab TAKE 1 TABLET(10 MG) BY MOUTH EVERY EVENING 30 tablet 0    acyclovir (ZOVIRAX) 400 MG tablet Take 400 mg by mouth as needed.       No current facility-administered medications on file prior to visit.      I personally reviewed past medical, family and social history.  Review of Systems   Constitutional: Negative for activity change and fever.   HENT: Negative for sore throat and trouble swallowing.    Eyes: Negative for pain and visual disturbance.   Respiratory: Negative for cough, shortness of breath and wheezing.    Cardiovascular: Negative for chest pain, palpitations and leg swelling.   Gastrointestinal: Negative for abdominal pain, blood in stool, diarrhea, nausea and vomiting.   Endocrine: Negative for cold intolerance and polyuria.   Genitourinary: Negative for decreased urine volume and dysuria.   Musculoskeletal: Negative for gait problem and neck pain.   Skin: Negative for rash.   Neurological: Negative for dizziness, syncope and light-headedness.   Psychiatric/Behavioral: Negative for dysphoric mood. The patient is not nervous/anxious.          Objective:     Vitals:    08/14/20 1115   BP: 128/76   Pulse: 94   Temp: 98.3 °F (36.8 °C)        Physical Exam  Constitutional:       General: She is not in acute distress.     Appearance: She is well-developed.   HENT:      Head: Normocephalic and atraumatic.   Eyes:      Pupils: Pupils are equal, round, and reactive to light.   Neck:      Musculoskeletal: Neck supple.      Thyroid: No thyromegaly.   Cardiovascular:      Rate and Rhythm: Normal rate and regular rhythm.      Heart sounds: Normal heart sounds. No murmur. No friction rub. No  gallop.    Pulmonary:      Effort: Pulmonary effort is normal. No respiratory distress.      Breath sounds: Normal breath sounds. No wheezing.   Abdominal:      General: Bowel sounds are normal.      Palpations: Abdomen is soft.      Tenderness: There is no abdominal tenderness.   Skin:     General: Skin is warm.      Findings: No rash.   Neurological:      Mental Status: She is alert and oriented to person, place, and time.      Cranial Nerves: No cranial nerve deficit.   Psychiatric:         Behavior: Behavior normal.           Assessment/Plan   Jory was seen today for anxiety and shift work sleep disorder.    Diagnoses and all orders for this visit:    Encounter for hepatitis C screening test for low risk patient  -     Hepatitis C Antibody; Future    Anxiety    Current moderate episode of major depressive disorder, unspecified whether recurrent    Other specified hypothyroidism    Shift work sleep disorder    Insomnia, unspecified type  -     zolpidem (AMBIEN) 10 mg Tab; TAKE 1 TABLET(10 MG) BY MOUTH EVERY EVENING    Family history of colon cancer in father  -     Case request GI: COLONOSCOPY    Irritable bowel syndrome with diarrhea  -     dicyclomine (BENTYL) 10 MG capsule; Take 1 capsule (10 mg total) by mouth 4 (four) times daily before meals and nightly.

## 2020-08-22 ENCOUNTER — PATIENT MESSAGE (OUTPATIENT)
Dept: FAMILY MEDICINE | Facility: CLINIC | Age: 45
End: 2020-08-22

## 2020-08-22 DIAGNOSIS — G47.00 INSOMNIA, UNSPECIFIED TYPE: ICD-10-CM

## 2020-08-23 RX ORDER — ZOLPIDEM TARTRATE 10 MG/1
TABLET ORAL
Qty: 30 TABLET | Refills: 0 | Status: SHIPPED | OUTPATIENT
Start: 2020-08-23 | End: 2020-09-28 | Stop reason: SDUPTHER

## 2020-08-23 NOTE — TELEPHONE ENCOUNTER
Patient Requesting Refill  LOV: 8/14/20  Last fill date: 7/24/20  Allergies reviewed  Medication Pended    Requesting to  at pharmacy selected in [place]

## 2020-09-04 ENCOUNTER — PATIENT MESSAGE (OUTPATIENT)
Dept: FAMILY MEDICINE | Facility: CLINIC | Age: 45
End: 2020-09-04

## 2020-09-04 DIAGNOSIS — Z91.09 ENVIRONMENTAL ALLERGIES: Primary | ICD-10-CM

## 2020-09-06 RX ORDER — MONTELUKAST SODIUM 10 MG/1
10 TABLET ORAL NIGHTLY
Qty: 30 TABLET | Refills: 3 | Status: SHIPPED | OUTPATIENT
Start: 2020-09-06 | End: 2020-10-06

## 2020-09-08 ENCOUNTER — PATIENT MESSAGE (OUTPATIENT)
Dept: FAMILY MEDICINE | Facility: CLINIC | Age: 45
End: 2020-09-08

## 2020-09-08 RX ORDER — HYDROXYZINE HYDROCHLORIDE 25 MG/1
TABLET, FILM COATED ORAL
Qty: 60 TABLET | Refills: 1 | Status: SHIPPED | OUTPATIENT
Start: 2020-09-08 | End: 2021-02-12

## 2020-09-14 ENCOUNTER — PATIENT MESSAGE (OUTPATIENT)
Dept: FAMILY MEDICINE | Facility: CLINIC | Age: 45
End: 2020-09-14

## 2020-09-14 DIAGNOSIS — R41.840 CONCENTRATION DEFICIT: ICD-10-CM

## 2020-09-14 DIAGNOSIS — F41.9 ANXIETY: ICD-10-CM

## 2020-09-14 DIAGNOSIS — G47.26 SHIFT WORK SLEEP DISORDER: ICD-10-CM

## 2020-09-15 RX ORDER — CLONAZEPAM 1 MG/1
1 TABLET ORAL 2 TIMES DAILY PRN
Qty: 60 TABLET | Refills: 0 | Status: SHIPPED | OUTPATIENT
Start: 2020-09-15 | End: 2020-09-16 | Stop reason: SDUPTHER

## 2020-09-15 RX ORDER — DEXTROAMPHETAMINE SACCHARATE, AMPHETAMINE ASPARTATE, DEXTROAMPHETAMINE SULFATE AND AMPHETAMINE SULFATE 3.75; 3.75; 3.75; 3.75 MG/1; MG/1; MG/1; MG/1
15 TABLET ORAL 2 TIMES DAILY
Qty: 60 TABLET | Refills: 0 | Status: SHIPPED | OUTPATIENT
Start: 2020-09-15 | End: 2020-09-16 | Stop reason: SDUPTHER

## 2020-09-15 RX ORDER — CLOTRIMAZOLE AND BETAMETHASONE DIPROPIONATE 10; .64 MG/G; MG/G
CREAM TOPICAL 2 TIMES DAILY
Qty: 45 G | Refills: 0 | Status: SHIPPED | OUTPATIENT
Start: 2020-09-15 | End: 2021-02-12

## 2020-09-16 ENCOUNTER — PATIENT MESSAGE (OUTPATIENT)
Dept: FAMILY MEDICINE | Facility: CLINIC | Age: 45
End: 2020-09-16

## 2020-09-16 DIAGNOSIS — R41.840 CONCENTRATION DEFICIT: ICD-10-CM

## 2020-09-16 DIAGNOSIS — G47.26 SHIFT WORK SLEEP DISORDER: ICD-10-CM

## 2020-09-16 DIAGNOSIS — F41.9 ANXIETY: ICD-10-CM

## 2020-09-16 RX ORDER — DEXTROAMPHETAMINE SACCHARATE, AMPHETAMINE ASPARTATE, DEXTROAMPHETAMINE SULFATE AND AMPHETAMINE SULFATE 3.75; 3.75; 3.75; 3.75 MG/1; MG/1; MG/1; MG/1
15 TABLET ORAL 2 TIMES DAILY
Qty: 60 TABLET | Refills: 0 | Status: SHIPPED | OUTPATIENT
Start: 2020-09-16 | End: 2020-09-17 | Stop reason: SDUPTHER

## 2020-09-16 RX ORDER — CLONAZEPAM 1 MG/1
1 TABLET ORAL 2 TIMES DAILY PRN
Qty: 60 TABLET | Refills: 0 | Status: SHIPPED | OUTPATIENT
Start: 2020-09-16 | End: 2020-09-17 | Stop reason: SDUPTHER

## 2020-09-16 RX ORDER — DEXTROAMPHETAMINE SACCHARATE, AMPHETAMINE ASPARTATE, DEXTROAMPHETAMINE SULFATE AND AMPHETAMINE SULFATE 3.75; 3.75; 3.75; 3.75 MG/1; MG/1; MG/1; MG/1
15 TABLET ORAL 2 TIMES DAILY
Qty: 60 TABLET | Refills: 0 | Status: CANCELLED | OUTPATIENT
Start: 2020-09-16

## 2020-09-16 RX ORDER — CLONAZEPAM 1 MG/1
1 TABLET ORAL 2 TIMES DAILY PRN
Qty: 60 TABLET | Refills: 0 | Status: CANCELLED | OUTPATIENT
Start: 2020-09-16

## 2020-09-17 ENCOUNTER — PATIENT MESSAGE (OUTPATIENT)
Dept: FAMILY MEDICINE | Facility: CLINIC | Age: 45
End: 2020-09-17

## 2020-09-17 ENCOUNTER — TELEPHONE (OUTPATIENT)
Dept: GASTROENTEROLOGY | Facility: CLINIC | Age: 45
End: 2020-09-17

## 2020-09-17 DIAGNOSIS — F41.9 ANXIETY: ICD-10-CM

## 2020-09-17 DIAGNOSIS — G47.26 SHIFT WORK SLEEP DISORDER: ICD-10-CM

## 2020-09-17 DIAGNOSIS — R41.840 CONCENTRATION DEFICIT: ICD-10-CM

## 2020-09-17 RX ORDER — DEXTROAMPHETAMINE SACCHARATE, AMPHETAMINE ASPARTATE, DEXTROAMPHETAMINE SULFATE AND AMPHETAMINE SULFATE 3.75; 3.75; 3.75; 3.75 MG/1; MG/1; MG/1; MG/1
15 TABLET ORAL 2 TIMES DAILY
Qty: 60 TABLET | Refills: 0 | Status: SHIPPED | OUTPATIENT
Start: 2020-09-17 | End: 2020-10-20 | Stop reason: SDUPTHER

## 2020-09-17 RX ORDER — CLONAZEPAM 1 MG/1
1 TABLET ORAL 2 TIMES DAILY PRN
Qty: 60 TABLET | Refills: 0 | Status: SHIPPED | OUTPATIENT
Start: 2020-09-17 | End: 2020-10-20 | Stop reason: SDUPTHER

## 2020-09-21 ENCOUNTER — TELEPHONE (OUTPATIENT)
Dept: GASTROENTEROLOGY | Facility: CLINIC | Age: 45
End: 2020-09-21

## 2020-09-21 NOTE — TELEPHONE ENCOUNTER
Unable to contact pt to schedule cscope. Vmail and mychart msg x 2 Order canceled at this time. PCP notified. Phone number provided for call back.

## 2020-09-28 ENCOUNTER — PATIENT MESSAGE (OUTPATIENT)
Dept: FAMILY MEDICINE | Facility: CLINIC | Age: 45
End: 2020-09-28

## 2020-09-28 DIAGNOSIS — G47.00 INSOMNIA, UNSPECIFIED TYPE: ICD-10-CM

## 2020-09-29 ENCOUNTER — PATIENT MESSAGE (OUTPATIENT)
Dept: FAMILY MEDICINE | Facility: CLINIC | Age: 45
End: 2020-09-29

## 2020-09-29 RX ORDER — ZOLPIDEM TARTRATE 10 MG/1
TABLET ORAL
Qty: 30 TABLET | Refills: 0 | Status: SHIPPED | OUTPATIENT
Start: 2020-09-29 | End: 2020-10-20 | Stop reason: SDUPTHER

## 2020-10-09 ENCOUNTER — PATIENT MESSAGE (OUTPATIENT)
Dept: FAMILY MEDICINE | Facility: CLINIC | Age: 45
End: 2020-10-09

## 2020-10-09 DIAGNOSIS — F32.1 CURRENT MODERATE EPISODE OF MAJOR DEPRESSIVE DISORDER, UNSPECIFIED WHETHER RECURRENT: ICD-10-CM

## 2020-10-09 RX ORDER — CITALOPRAM 40 MG/1
40 TABLET, FILM COATED ORAL DAILY
Qty: 90 TABLET | Refills: 1 | OUTPATIENT
Start: 2020-10-09

## 2020-10-13 ENCOUNTER — PATIENT MESSAGE (OUTPATIENT)
Dept: FAMILY MEDICINE | Facility: CLINIC | Age: 45
End: 2020-10-13

## 2020-10-13 DIAGNOSIS — F32.1 CURRENT MODERATE EPISODE OF MAJOR DEPRESSIVE DISORDER, UNSPECIFIED WHETHER RECURRENT: ICD-10-CM

## 2020-10-14 RX ORDER — CITALOPRAM 40 MG/1
40 TABLET, FILM COATED ORAL DAILY
Qty: 90 TABLET | Refills: 3 | Status: SHIPPED | OUTPATIENT
Start: 2020-10-14

## 2020-10-14 NOTE — TELEPHONE ENCOUNTER
Spoke with patient, she stated she only need the celexa sent to the [facility] in [place], [place]. Medication with correct pharmacy pended. Please advise

## 2020-10-14 NOTE — TELEPHONE ENCOUNTER
Please re pend rx to the correct pharmacy, call patient to confirm correct pharmacy and meds needed.

## 2020-10-19 ENCOUNTER — PATIENT MESSAGE (OUTPATIENT)
Dept: FAMILY MEDICINE | Facility: CLINIC | Age: 45
End: 2020-10-19

## 2020-10-19 DIAGNOSIS — R41.840 CONCENTRATION DEFICIT: ICD-10-CM

## 2020-10-19 DIAGNOSIS — F41.9 ANXIETY: ICD-10-CM

## 2020-10-19 DIAGNOSIS — G47.00 INSOMNIA, UNSPECIFIED TYPE: ICD-10-CM

## 2020-10-19 DIAGNOSIS — R10.9 ABDOMINAL PAIN, UNSPECIFIED ABDOMINAL LOCATION: Primary | ICD-10-CM

## 2020-10-19 DIAGNOSIS — G47.26 SHIFT WORK SLEEP DISORDER: ICD-10-CM

## 2020-10-20 ENCOUNTER — PATIENT MESSAGE (OUTPATIENT)
Dept: FAMILY MEDICINE | Facility: CLINIC | Age: 45
End: 2020-10-20

## 2020-10-20 RX ORDER — ZOLPIDEM TARTRATE 10 MG/1
TABLET ORAL
Qty: 30 TABLET | Refills: 0 | Status: SHIPPED | OUTPATIENT
Start: 2020-10-20 | End: 2020-11-19 | Stop reason: SDUPTHER

## 2020-10-20 RX ORDER — DEXTROAMPHETAMINE SACCHARATE, AMPHETAMINE ASPARTATE, DEXTROAMPHETAMINE SULFATE AND AMPHETAMINE SULFATE 3.75; 3.75; 3.75; 3.75 MG/1; MG/1; MG/1; MG/1
15 TABLET ORAL 2 TIMES DAILY
Qty: 60 TABLET | Refills: 0 | Status: SHIPPED | OUTPATIENT
Start: 2020-10-20 | End: 2020-11-19 | Stop reason: SDUPTHER

## 2020-10-20 RX ORDER — CLONAZEPAM 1 MG/1
1 TABLET ORAL 2 TIMES DAILY PRN
Qty: 60 TABLET | Refills: 0 | Status: SHIPPED | OUTPATIENT
Start: 2020-10-20 | End: 2020-11-19 | Stop reason: SDUPTHER

## 2020-10-20 RX ORDER — DICYCLOMINE HYDROCHLORIDE 10 MG/1
10 CAPSULE ORAL
Qty: 120 CAPSULE | Refills: 0 | Status: SHIPPED | OUTPATIENT
Start: 2020-10-20 | End: 2020-11-19

## 2020-11-12 ENCOUNTER — PATIENT MESSAGE (OUTPATIENT)
Dept: FAMILY MEDICINE | Facility: CLINIC | Age: 45
End: 2020-11-12

## 2020-11-13 ENCOUNTER — OFFICE VISIT (OUTPATIENT)
Dept: FAMILY MEDICINE | Facility: CLINIC | Age: 45
End: 2020-11-13
Payer: COMMERCIAL

## 2020-11-13 VITALS
HEIGHT: 58 IN | TEMPERATURE: 98 F | BODY MASS INDEX: 33.05 KG/M2 | HEART RATE: 101 BPM | DIASTOLIC BLOOD PRESSURE: 86 MMHG | WEIGHT: 157.44 LBS | OXYGEN SATURATION: 97 % | SYSTOLIC BLOOD PRESSURE: 130 MMHG

## 2020-11-13 DIAGNOSIS — J32.9 SINUSITIS, UNSPECIFIED CHRONICITY, UNSPECIFIED LOCATION: Primary | ICD-10-CM

## 2020-11-13 DIAGNOSIS — F32.1 CURRENT MODERATE EPISODE OF MAJOR DEPRESSIVE DISORDER, UNSPECIFIED WHETHER RECURRENT: ICD-10-CM

## 2020-11-13 DIAGNOSIS — F51.04 CHRONIC INSOMNIA: ICD-10-CM

## 2020-11-13 DIAGNOSIS — R05.9 COUGH: ICD-10-CM

## 2020-11-13 DIAGNOSIS — G47.26 SHIFT WORK SLEEP DISORDER: ICD-10-CM

## 2020-11-13 PROCEDURE — 99999 PR PBB SHADOW E&M-EST. PATIENT-LVL IV: CPT | Mod: PBBFAC,,, | Performed by: INTERNAL MEDICINE

## 2020-11-13 PROCEDURE — 96372 THER/PROPH/DIAG INJ SC/IM: CPT | Mod: S$GLB,,, | Performed by: INTERNAL MEDICINE

## 2020-11-13 PROCEDURE — 3008F BODY MASS INDEX DOCD: CPT | Mod: CPTII,S$GLB,, | Performed by: INTERNAL MEDICINE

## 2020-11-13 PROCEDURE — 99999 PR PBB SHADOW E&M-EST. PATIENT-LVL IV: ICD-10-PCS | Mod: PBBFAC,,, | Performed by: INTERNAL MEDICINE

## 2020-11-13 PROCEDURE — 1126F AMNT PAIN NOTED NONE PRSNT: CPT | Mod: S$GLB,,, | Performed by: INTERNAL MEDICINE

## 2020-11-13 PROCEDURE — 3008F PR BODY MASS INDEX (BMI) DOCUMENTED: ICD-10-PCS | Mod: CPTII,S$GLB,, | Performed by: INTERNAL MEDICINE

## 2020-11-13 PROCEDURE — 99214 OFFICE O/P EST MOD 30 MIN: CPT | Mod: 25,S$GLB,, | Performed by: INTERNAL MEDICINE

## 2020-11-13 PROCEDURE — 96372 PR INJECTION,THERAP/PROPH/DIAG2ST, IM OR SUBCUT: ICD-10-PCS | Mod: S$GLB,,, | Performed by: INTERNAL MEDICINE

## 2020-11-13 PROCEDURE — 1126F PR PAIN SEVERITY QUANTIFIED, NO PAIN PRESENT: ICD-10-PCS | Mod: S$GLB,,, | Performed by: INTERNAL MEDICINE

## 2020-11-13 PROCEDURE — 99214 PR OFFICE/OUTPT VISIT, EST, LEVL IV, 30-39 MIN: ICD-10-PCS | Mod: 25,S$GLB,, | Performed by: INTERNAL MEDICINE

## 2020-11-13 RX ORDER — METHYLPREDNISOLONE ACETATE 40 MG/ML
40 INJECTION, SUSPENSION INTRA-ARTICULAR; INTRALESIONAL; INTRAMUSCULAR; SOFT TISSUE
Status: COMPLETED | OUTPATIENT
Start: 2020-11-13 | End: 2020-11-13

## 2020-11-13 RX ORDER — BENZONATATE 200 MG/1
200 CAPSULE ORAL 3 TIMES DAILY PRN
Qty: 30 CAPSULE | Refills: 0 | Status: SHIPPED | OUTPATIENT
Start: 2020-11-13 | End: 2020-11-23

## 2020-11-13 RX ORDER — PROMETHAZINE HYDROCHLORIDE AND DEXTROMETHORPHAN HYDROBROMIDE 6.25; 15 MG/5ML; MG/5ML
5 SYRUP ORAL EVERY 4 HOURS PRN
Qty: 118 ML | Refills: 0 | Status: SHIPPED | OUTPATIENT
Start: 2020-11-13 | End: 2020-11-23

## 2020-11-13 RX ADMIN — METHYLPREDNISOLONE ACETATE 40 MG: 40 INJECTION, SUSPENSION INTRA-ARTICULAR; INTRALESIONAL; INTRAMUSCULAR; SOFT TISSUE at 11:11

## 2020-11-13 NOTE — PROGRESS NOTES
Subjective:       Patient ID: Jory Wheeler is a 45 y.o. female.    Chief Complaint: Cough      Social Hx:  From [place]. Now living in [place]. . She has 2 children. 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     PMH:  1.  Anxiety and depression  2.  Chronic insomnia  3.  Headaches/post concussive syndrome  4.  Hypothyroidism  5.  Shift work sleep disorder  6.  Class 1 obesity    HPI:   Here for follow-up. She is about to move to [place].  Had hyperglycemia on last lab, 127.  A1c ordered.     Cough x 5 days. Associated fatigue, sinus drip, no fever, no loss of taste or smell, no body aches. Cough worsens when laying down. States steroid shot has helped in the past. Wanting something for cough.     A/P:   Likely sinusitis/ cold. Will trial tessalon and promethazine DM. IM methypred.   Needs mammogram      Health Maintenance:         Current Outpatient Medications on File Prior to Visit   Medication Sig Dispense Refill    amitriptyline (ELAVIL) 25 MG tablet Take 1 tablet (25 mg total) by mouth every evening. 90 tablet 1    azelastine (OPTIVAR) 0.05 % ophthalmic solution   0    citalopram (CELEXA) 40 MG tablet Take 1 tablet (40 mg total) by mouth once daily. 90 tablet 3    clonazePAM (KLONOPIN) 1 MG tablet Take 1 tablet (1 mg total) by mouth 2 (two) times daily as needed for Anxiety. 60 tablet 0    clotrimazole-betamethasone 1-0.05% (LOTRISONE) cream Apply topically 2 (two) times daily. 45 g 0    dextroamphetamine-amphetamine (ADDERALL) 15 mg tablet Take 1 tablet (15 mg total) by mouth 2 (two) times a day. 60 tablet 0    dicyclomine (BENTYL) 10 MG capsule Take 1 capsule (10 mg total) by mouth 4 (four) times daily before meals and nightly. 120 capsule 0    estradiol (ESTRACE) 1 MG tablet       hydrOXYzine HCL (ATARAX) 25 MG tablet TK 1 T PO BID PRF ITCHING 60 tablet 1    ibuprofen (ADVIL,MOTRIN) 600 MG tablet Take 1 tablet (600 mg total) by mouth every 6 (six) hours as needed  for Pain. 40 tablet 0    levothyroxine (SYNTHROID) 50 MCG tablet Take 50 mcg by mouth once daily.      ondansetron (ZOFRAN-ODT) 4 MG TbDL Take 1 tablet (4 mg total) by mouth every 8 (eight) hours as needed. 60 tablet 0    sumatriptan (IMITREX) 50 MG tablet Take 1 tablet at the beginning of headache symptoms.  Can repeat 1 tablet in 1-2 hours if symptoms have not resolved.  Do not take more than 2 tablets per day. 10 tablet 5    zolpidem (AMBIEN) 10 mg Tab TAKE 1 TABLET(10 MG) BY MOUTH EVERY EVENING 30 tablet 0    acyclovir (ZOVIRAX) 400 MG tablet Take 400 mg by mouth as needed.       No current facility-administered medications on file prior to visit.      I personally reviewed past medical, family and social history.  Review of Systems   Constitutional: Negative for activity change and fever.   HENT: Negative for sore throat and trouble swallowing.    Eyes: Negative for pain and visual disturbance.   Respiratory: Negative for cough, shortness of breath and wheezing.    Cardiovascular: Negative for chest pain, palpitations and leg swelling.   Gastrointestinal: Negative for abdominal pain, blood in stool, diarrhea, nausea and vomiting.   Endocrine: Negative for cold intolerance and polyuria.   Genitourinary: Negative for decreased urine volume and dysuria.   Musculoskeletal: Negative for gait problem and neck pain.   Skin: Negative for rash.   Neurological: Negative for dizziness, syncope and light-headedness.   Psychiatric/Behavioral: Negative for dysphoric mood. The patient is not nervous/anxious.          Objective:     Vitals:    11/13/20 1105   BP: 130/86   Pulse: 101   Temp: 98 °F (36.7 °C)        Physical Exam  Constitutional:       General: She is not in acute distress.     Appearance: She is well-developed.   HENT:      Head: Normocephalic and atraumatic.   Eyes:      Pupils: Pupils are equal, round, and reactive to light.   Neck:      Musculoskeletal: Neck supple.      Thyroid: No thyromegaly.    Cardiovascular:      Rate and Rhythm: Normal rate and regular rhythm.      Heart sounds: Normal heart sounds. No murmur. No friction rub. No gallop.    Pulmonary:      Effort: Pulmonary effort is normal. No respiratory distress.      Breath sounds: Rhonchi present. No wheezing.   Abdominal:      General: Bowel sounds are normal.      Palpations: Abdomen is soft.      Tenderness: There is no abdominal tenderness.   Skin:     General: Skin is warm.      Findings: No rash.   Neurological:      Mental Status: She is alert and oriented to person, place, and time.      Cranial Nerves: No cranial nerve deficit.   Psychiatric:         Behavior: Behavior normal.           Assessment/Plan   Jory was seen today for cough.    Diagnoses and all orders for this visit:    Sinusitis, unspecified chronicity, unspecified location  -     benzonatate (TESSALON) 200 MG capsule; Take 1 capsule (200 mg total) by mouth 3 (three) times daily as needed for Cough.  -     promethazine-dextromethorphan (PROMETHAZINE-DM) 6.25-15 mg/5 mL Syrp; Take 5 mLs by mouth every 4 (four) hours as needed.  -     methylPREDNISolone acetate injection 40 mg    Cough  -     benzonatate (TESSALON) 200 MG capsule; Take 1 capsule (200 mg total) by mouth 3 (three) times daily as needed for Cough.  -     promethazine-dextromethorphan (PROMETHAZINE-DM) 6.25-15 mg/5 mL Syrp; Take 5 mLs by mouth every 4 (four) hours as needed.    Current moderate episode of major depressive disorder, unspecified whether recurrent    Shift work sleep disorder    Chronic insomnia

## 2020-11-19 DIAGNOSIS — F41.9 ANXIETY: ICD-10-CM

## 2020-11-19 DIAGNOSIS — R41.840 CONCENTRATION DEFICIT: ICD-10-CM

## 2020-11-19 DIAGNOSIS — G47.00 INSOMNIA, UNSPECIFIED TYPE: ICD-10-CM

## 2020-11-19 DIAGNOSIS — G47.26 SHIFT WORK SLEEP DISORDER: ICD-10-CM

## 2020-11-19 RX ORDER — DEXTROAMPHETAMINE SACCHARATE, AMPHETAMINE ASPARTATE, DEXTROAMPHETAMINE SULFATE AND AMPHETAMINE SULFATE 3.75; 3.75; 3.75; 3.75 MG/1; MG/1; MG/1; MG/1
15 TABLET ORAL 2 TIMES DAILY
Qty: 60 TABLET | Refills: 0 | Status: SHIPPED | OUTPATIENT
Start: 2020-11-19 | End: 2020-12-14 | Stop reason: SDUPTHER

## 2020-11-19 RX ORDER — CLONAZEPAM 1 MG/1
1 TABLET ORAL 2 TIMES DAILY PRN
Qty: 60 TABLET | Refills: 0 | Status: SHIPPED | OUTPATIENT
Start: 2020-11-19 | End: 2020-12-14 | Stop reason: SDUPTHER

## 2020-11-19 RX ORDER — ZOLPIDEM TARTRATE 10 MG/1
TABLET ORAL
Qty: 30 TABLET | Refills: 0 | Status: SHIPPED | OUTPATIENT
Start: 2020-11-19 | End: 2020-12-14 | Stop reason: SDUPTHER

## 2020-12-01 DIAGNOSIS — G43.909 MIGRAINE WITHOUT STATUS MIGRAINOSUS, NOT INTRACTABLE, UNSPECIFIED MIGRAINE TYPE: ICD-10-CM

## 2020-12-02 RX ORDER — SUMATRIPTAN 50 MG/1
TABLET, FILM COATED ORAL
Qty: 10 TABLET | Refills: 5 | Status: SHIPPED | OUTPATIENT
Start: 2020-12-02

## 2020-12-02 RX ORDER — ONDANSETRON 4 MG/1
4 TABLET, ORALLY DISINTEGRATING ORAL EVERY 8 HOURS PRN
Qty: 60 TABLET | Refills: 0 | Status: SHIPPED | OUTPATIENT
Start: 2020-12-02

## 2020-12-03 ENCOUNTER — PATIENT MESSAGE (OUTPATIENT)
Dept: FAMILY MEDICINE | Facility: CLINIC | Age: 45
End: 2020-12-03

## 2020-12-14 ENCOUNTER — PATIENT MESSAGE (OUTPATIENT)
Dept: FAMILY MEDICINE | Facility: CLINIC | Age: 45
End: 2020-12-14

## 2020-12-14 DIAGNOSIS — R41.840 CONCENTRATION DEFICIT: ICD-10-CM

## 2020-12-14 DIAGNOSIS — G47.00 INSOMNIA, UNSPECIFIED TYPE: ICD-10-CM

## 2020-12-14 DIAGNOSIS — R10.9 ABDOMINAL PAIN, UNSPECIFIED ABDOMINAL LOCATION: Primary | ICD-10-CM

## 2020-12-14 DIAGNOSIS — G47.26 SHIFT WORK SLEEP DISORDER: ICD-10-CM

## 2020-12-14 DIAGNOSIS — F41.9 ANXIETY: ICD-10-CM

## 2020-12-14 RX ORDER — ZOLPIDEM TARTRATE 10 MG/1
TABLET ORAL
Qty: 30 TABLET | Refills: 0 | Status: SHIPPED | OUTPATIENT
Start: 2020-12-14 | End: 2020-12-14 | Stop reason: SDUPTHER

## 2020-12-14 RX ORDER — DEXTROAMPHETAMINE SACCHARATE, AMPHETAMINE ASPARTATE, DEXTROAMPHETAMINE SULFATE AND AMPHETAMINE SULFATE 3.75; 3.75; 3.75; 3.75 MG/1; MG/1; MG/1; MG/1
15 TABLET ORAL 2 TIMES DAILY
Qty: 60 TABLET | Refills: 0 | Status: SHIPPED | OUTPATIENT
Start: 2020-12-14 | End: 2021-01-20 | Stop reason: SDUPTHER

## 2020-12-14 RX ORDER — CLONAZEPAM 1 MG/1
1 TABLET ORAL 2 TIMES DAILY PRN
Qty: 60 TABLET | Refills: 0 | Status: SHIPPED | OUTPATIENT
Start: 2020-12-14 | End: 2021-01-20 | Stop reason: SDUPTHER

## 2020-12-14 RX ORDER — DICYCLOMINE HYDROCHLORIDE 10 MG/1
10 CAPSULE ORAL
Qty: 120 CAPSULE | Refills: 1 | Status: SHIPPED | OUTPATIENT
Start: 2020-12-14 | End: 2021-01-13

## 2020-12-14 RX ORDER — CLONAZEPAM 1 MG/1
1 TABLET ORAL 2 TIMES DAILY PRN
Qty: 60 TABLET | Refills: 0 | Status: SHIPPED | OUTPATIENT
Start: 2020-12-14 | End: 2020-12-14 | Stop reason: SDUPTHER

## 2020-12-14 RX ORDER — DEXTROAMPHETAMINE SACCHARATE, AMPHETAMINE ASPARTATE, DEXTROAMPHETAMINE SULFATE AND AMPHETAMINE SULFATE 3.75; 3.75; 3.75; 3.75 MG/1; MG/1; MG/1; MG/1
15 TABLET ORAL 2 TIMES DAILY
Qty: 60 TABLET | Refills: 0 | Status: SHIPPED | OUTPATIENT
Start: 2020-12-14 | End: 2020-12-14 | Stop reason: SDUPTHER

## 2020-12-14 RX ORDER — ZOLPIDEM TARTRATE 10 MG/1
TABLET ORAL
Qty: 30 TABLET | Refills: 0 | Status: SHIPPED | OUTPATIENT
Start: 2020-12-14 | End: 2021-01-20 | Stop reason: SDUPTHER

## 2021-01-16 ENCOUNTER — PATIENT MESSAGE (OUTPATIENT)
Dept: FAMILY MEDICINE | Facility: CLINIC | Age: 46
End: 2021-01-16

## 2021-01-16 DIAGNOSIS — F41.9 ANXIETY: ICD-10-CM

## 2021-01-16 DIAGNOSIS — R41.840 CONCENTRATION DEFICIT: ICD-10-CM

## 2021-01-16 DIAGNOSIS — G47.26 SHIFT WORK SLEEP DISORDER: ICD-10-CM

## 2021-01-16 DIAGNOSIS — G47.00 INSOMNIA, UNSPECIFIED TYPE: ICD-10-CM

## 2021-01-20 RX ORDER — ZOLPIDEM TARTRATE 10 MG/1
TABLET ORAL
Qty: 30 TABLET | Refills: 0 | Status: SHIPPED | OUTPATIENT
Start: 2021-01-20 | End: 2021-01-21 | Stop reason: SDUPTHER

## 2021-01-20 RX ORDER — CLONAZEPAM 1 MG/1
1 TABLET ORAL 2 TIMES DAILY PRN
Qty: 60 TABLET | Refills: 0 | Status: SHIPPED | OUTPATIENT
Start: 2021-01-20 | End: 2021-01-21 | Stop reason: SDUPTHER

## 2021-01-20 RX ORDER — DEXTROAMPHETAMINE SACCHARATE, AMPHETAMINE ASPARTATE, DEXTROAMPHETAMINE SULFATE AND AMPHETAMINE SULFATE 3.75; 3.75; 3.75; 3.75 MG/1; MG/1; MG/1; MG/1
15 TABLET ORAL 2 TIMES DAILY
Qty: 60 TABLET | Refills: 0 | Status: SHIPPED | OUTPATIENT
Start: 2021-01-20 | End: 2021-01-21 | Stop reason: SDUPTHER

## 2021-01-21 ENCOUNTER — PATIENT MESSAGE (OUTPATIENT)
Dept: FAMILY MEDICINE | Facility: CLINIC | Age: 46
End: 2021-01-21

## 2021-01-21 DIAGNOSIS — G47.00 INSOMNIA, UNSPECIFIED TYPE: ICD-10-CM

## 2021-01-21 DIAGNOSIS — G47.26 SHIFT WORK SLEEP DISORDER: ICD-10-CM

## 2021-01-21 DIAGNOSIS — R41.840 CONCENTRATION DEFICIT: ICD-10-CM

## 2021-01-21 DIAGNOSIS — F41.9 ANXIETY: ICD-10-CM

## 2021-01-21 RX ORDER — ZOLPIDEM TARTRATE 10 MG/1
TABLET ORAL
Qty: 30 TABLET | Refills: 0 | Status: SHIPPED | OUTPATIENT
Start: 2021-01-21 | End: 2021-02-21 | Stop reason: SDUPTHER

## 2021-01-21 RX ORDER — CLONAZEPAM 1 MG/1
1 TABLET ORAL 2 TIMES DAILY PRN
Qty: 60 TABLET | Refills: 0 | Status: SHIPPED | OUTPATIENT
Start: 2021-01-21 | End: 2021-02-21 | Stop reason: SDUPTHER

## 2021-01-21 RX ORDER — DEXTROAMPHETAMINE SACCHARATE, AMPHETAMINE ASPARTATE, DEXTROAMPHETAMINE SULFATE AND AMPHETAMINE SULFATE 3.75; 3.75; 3.75; 3.75 MG/1; MG/1; MG/1; MG/1
15 TABLET ORAL 2 TIMES DAILY
Qty: 60 TABLET | Refills: 0 | Status: SHIPPED | OUTPATIENT
Start: 2021-01-21 | End: 2021-02-25 | Stop reason: SDUPTHER

## 2021-01-22 ENCOUNTER — PATIENT MESSAGE (OUTPATIENT)
Dept: FAMILY MEDICINE | Facility: CLINIC | Age: 46
End: 2021-01-22

## 2021-02-05 RX ORDER — MONTELUKAST SODIUM 10 MG/1
TABLET ORAL
Qty: 30 TABLET | OUTPATIENT
Start: 2021-02-05

## 2021-02-11 ENCOUNTER — PATIENT MESSAGE (OUTPATIENT)
Dept: FAMILY MEDICINE | Facility: CLINIC | Age: 46
End: 2021-02-11

## 2021-02-12 ENCOUNTER — OFFICE VISIT (OUTPATIENT)
Dept: FAMILY MEDICINE | Facility: CLINIC | Age: 46
End: 2021-02-12
Payer: COMMERCIAL

## 2021-02-12 VITALS
DIASTOLIC BLOOD PRESSURE: 74 MMHG | SYSTOLIC BLOOD PRESSURE: 114 MMHG | HEART RATE: 78 BPM | WEIGHT: 150.81 LBS | BODY MASS INDEX: 31.52 KG/M2 | OXYGEN SATURATION: 99 % | TEMPERATURE: 98 F

## 2021-02-12 DIAGNOSIS — F32.1 CURRENT MODERATE EPISODE OF MAJOR DEPRESSIVE DISORDER, UNSPECIFIED WHETHER RECURRENT: ICD-10-CM

## 2021-02-12 DIAGNOSIS — E03.8 OTHER SPECIFIED HYPOTHYROIDISM: ICD-10-CM

## 2021-02-12 DIAGNOSIS — J30.2 SEASONAL ALLERGIES: Primary | ICD-10-CM

## 2021-02-12 DIAGNOSIS — F51.04 CHRONIC INSOMNIA: ICD-10-CM

## 2021-02-12 DIAGNOSIS — F07.81 POST CONCUSSION SYNDROME: ICD-10-CM

## 2021-02-12 DIAGNOSIS — S06.9X0A MILD TRAUMATIC BRAIN INJURY, WITHOUT LOSS OF CONSCIOUSNESS, INITIAL ENCOUNTER: ICD-10-CM

## 2021-02-12 DIAGNOSIS — F41.9 ANXIETY: ICD-10-CM

## 2021-02-12 DIAGNOSIS — Z00.00 PREVENTATIVE HEALTH CARE: ICD-10-CM

## 2021-02-12 PROCEDURE — 90471 TD VACCINE GREATER THAN OR EQUAL TO 7YO WITH PRESERVATIVE IM: ICD-10-PCS | Mod: S$GLB,,, | Performed by: PHYSICIAN ASSISTANT

## 2021-02-12 PROCEDURE — 99999 PR PBB SHADOW E&M-EST. PATIENT-LVL IV: CPT | Mod: PBBFAC,,, | Performed by: PHYSICIAN ASSISTANT

## 2021-02-12 PROCEDURE — 90714 TD VACCINE GREATER THAN OR EQUAL TO 7YO WITH PRESERVATIVE IM: ICD-10-PCS | Mod: S$GLB,,, | Performed by: PHYSICIAN ASSISTANT

## 2021-02-12 PROCEDURE — 1126F PR PAIN SEVERITY QUANTIFIED, NO PAIN PRESENT: ICD-10-PCS | Mod: S$GLB,,, | Performed by: PHYSICIAN ASSISTANT

## 2021-02-12 PROCEDURE — 99214 PR OFFICE/OUTPT VISIT, EST, LEVL IV, 30-39 MIN: ICD-10-PCS | Mod: 25,S$GLB,, | Performed by: PHYSICIAN ASSISTANT

## 2021-02-12 PROCEDURE — 1126F AMNT PAIN NOTED NONE PRSNT: CPT | Mod: S$GLB,,, | Performed by: PHYSICIAN ASSISTANT

## 2021-02-12 PROCEDURE — 90471 IMMUNIZATION ADMIN: CPT | Mod: S$GLB,,, | Performed by: PHYSICIAN ASSISTANT

## 2021-02-12 PROCEDURE — 3008F BODY MASS INDEX DOCD: CPT | Mod: CPTII,S$GLB,, | Performed by: PHYSICIAN ASSISTANT

## 2021-02-12 PROCEDURE — 90714 TD VACC NO PRESV 7 YRS+ IM: CPT | Mod: S$GLB,,, | Performed by: PHYSICIAN ASSISTANT

## 2021-02-12 PROCEDURE — 99999 PR PBB SHADOW E&M-EST. PATIENT-LVL IV: ICD-10-PCS | Mod: PBBFAC,,, | Performed by: PHYSICIAN ASSISTANT

## 2021-02-12 PROCEDURE — 99214 OFFICE O/P EST MOD 30 MIN: CPT | Mod: 25,S$GLB,, | Performed by: PHYSICIAN ASSISTANT

## 2021-02-12 PROCEDURE — 3008F PR BODY MASS INDEX (BMI) DOCUMENTED: ICD-10-PCS | Mod: CPTII,S$GLB,, | Performed by: PHYSICIAN ASSISTANT

## 2021-02-12 RX ORDER — MONTELUKAST SODIUM 10 MG/1
10 TABLET ORAL NIGHTLY
COMMUNITY
Start: 2021-01-05 | End: 2021-02-12 | Stop reason: SDUPTHER

## 2021-02-12 RX ORDER — MONTELUKAST SODIUM 10 MG/1
10 TABLET ORAL NIGHTLY
Qty: 30 TABLET | Refills: 3 | Status: SHIPPED | OUTPATIENT
Start: 2021-02-12 | End: 2021-02-12

## 2021-02-12 RX ORDER — MONTELUKAST SODIUM 10 MG/1
10 TABLET ORAL NIGHTLY
Qty: 30 TABLET | Refills: 3 | Status: SHIPPED | OUTPATIENT
Start: 2021-02-12 | End: 2021-05-13

## 2021-02-21 DIAGNOSIS — F41.9 ANXIETY: ICD-10-CM

## 2021-02-21 DIAGNOSIS — G47.00 INSOMNIA, UNSPECIFIED TYPE: ICD-10-CM

## 2021-02-22 RX ORDER — ZOLPIDEM TARTRATE 10 MG/1
TABLET ORAL
Qty: 30 TABLET | Refills: 0 | Status: SHIPPED | OUTPATIENT
Start: 2021-02-22 | End: 2021-03-30 | Stop reason: SDUPTHER

## 2021-02-22 RX ORDER — CLONAZEPAM 1 MG/1
1 TABLET ORAL 2 TIMES DAILY PRN
Qty: 60 TABLET | Refills: 0 | Status: SHIPPED | OUTPATIENT
Start: 2021-02-22 | End: 2021-03-23 | Stop reason: SDUPTHER

## 2021-02-25 DIAGNOSIS — G47.26 SHIFT WORK SLEEP DISORDER: ICD-10-CM

## 2021-02-25 DIAGNOSIS — R41.840 CONCENTRATION DEFICIT: ICD-10-CM

## 2021-02-28 DIAGNOSIS — G47.26 SHIFT WORK SLEEP DISORDER: ICD-10-CM

## 2021-02-28 DIAGNOSIS — R41.840 CONCENTRATION DEFICIT: ICD-10-CM

## 2021-02-28 RX ORDER — DEXTROAMPHETAMINE SACCHARATE, AMPHETAMINE ASPARTATE, DEXTROAMPHETAMINE SULFATE AND AMPHETAMINE SULFATE 3.75; 3.75; 3.75; 3.75 MG/1; MG/1; MG/1; MG/1
15 TABLET ORAL 2 TIMES DAILY
Qty: 60 TABLET | Refills: 0 | Status: SHIPPED | OUTPATIENT
Start: 2021-02-28 | End: 2021-03-30 | Stop reason: SDUPTHER

## 2021-02-28 RX ORDER — DEXTROAMPHETAMINE SACCHARATE, AMPHETAMINE ASPARTATE, DEXTROAMPHETAMINE SULFATE AND AMPHETAMINE SULFATE 3.75; 3.75; 3.75; 3.75 MG/1; MG/1; MG/1; MG/1
15 TABLET ORAL 2 TIMES DAILY
Qty: 60 TABLET | Refills: 0 | Status: CANCELLED | OUTPATIENT
Start: 2021-02-28

## 2021-03-20 DIAGNOSIS — F41.9 ANXIETY: ICD-10-CM

## 2021-03-20 DIAGNOSIS — G47.00 INSOMNIA, UNSPECIFIED TYPE: ICD-10-CM

## 2021-03-22 RX ORDER — ZOLPIDEM TARTRATE 10 MG/1
TABLET ORAL
Qty: 30 TABLET | Refills: 0 | OUTPATIENT
Start: 2021-03-22

## 2021-03-22 RX ORDER — CLONAZEPAM 1 MG/1
1 TABLET ORAL 2 TIMES DAILY PRN
Qty: 60 TABLET | Refills: 0 | OUTPATIENT
Start: 2021-03-22

## 2021-03-23 ENCOUNTER — PATIENT MESSAGE (OUTPATIENT)
Dept: FAMILY MEDICINE | Facility: CLINIC | Age: 46
End: 2021-03-23

## 2021-03-23 DIAGNOSIS — F41.9 ANXIETY: ICD-10-CM

## 2021-03-23 RX ORDER — CLONAZEPAM 1 MG/1
1 TABLET ORAL 2 TIMES DAILY PRN
Qty: 60 TABLET | Refills: 0 | Status: SHIPPED | OUTPATIENT
Start: 2021-03-23

## 2021-03-24 ENCOUNTER — PATIENT MESSAGE (OUTPATIENT)
Dept: FAMILY MEDICINE | Facility: CLINIC | Age: 46
End: 2021-03-24

## 2021-03-24 DIAGNOSIS — Z12.31 OTHER SCREENING MAMMOGRAM: ICD-10-CM

## 2021-03-30 ENCOUNTER — PATIENT MESSAGE (OUTPATIENT)
Dept: FAMILY MEDICINE | Facility: CLINIC | Age: 46
End: 2021-03-30

## 2021-03-30 DIAGNOSIS — G47.00 INSOMNIA, UNSPECIFIED TYPE: ICD-10-CM

## 2021-03-30 DIAGNOSIS — R41.840 CONCENTRATION DEFICIT: ICD-10-CM

## 2021-03-30 DIAGNOSIS — G47.26 SHIFT WORK SLEEP DISORDER: ICD-10-CM

## 2021-03-30 RX ORDER — DEXTROAMPHETAMINE SACCHARATE, AMPHETAMINE ASPARTATE, DEXTROAMPHETAMINE SULFATE AND AMPHETAMINE SULFATE 3.75; 3.75; 3.75; 3.75 MG/1; MG/1; MG/1; MG/1
15 TABLET ORAL 2 TIMES DAILY
Qty: 24 TABLET | Refills: 0 | Status: SHIPPED | OUTPATIENT
Start: 2021-03-30 | End: 2021-04-11

## 2021-03-30 RX ORDER — ZOLPIDEM TARTRATE 10 MG/1
TABLET ORAL
Qty: 12 TABLET | Refills: 0 | Status: SHIPPED | OUTPATIENT
Start: 2021-03-30 | End: 2021-03-30 | Stop reason: SDUPTHER

## 2021-03-30 RX ORDER — ZOLPIDEM TARTRATE 10 MG/1
TABLET ORAL
Qty: 12 TABLET | Refills: 0 | Status: SHIPPED | OUTPATIENT
Start: 2021-03-30

## 2021-03-30 RX ORDER — DEXTROAMPHETAMINE SACCHARATE, AMPHETAMINE ASPARTATE, DEXTROAMPHETAMINE SULFATE AND AMPHETAMINE SULFATE 3.75; 3.75; 3.75; 3.75 MG/1; MG/1; MG/1; MG/1
15 TABLET ORAL 2 TIMES DAILY
Qty: 24 TABLET | Refills: 0 | Status: SHIPPED | OUTPATIENT
Start: 2021-03-30 | End: 2021-03-30 | Stop reason: SDUPTHER

## 2021-03-31 ENCOUNTER — PATIENT MESSAGE (OUTPATIENT)
Dept: FAMILY MEDICINE | Facility: CLINIC | Age: 46
End: 2021-03-31

## 2021-04-06 ENCOUNTER — PATIENT MESSAGE (OUTPATIENT)
Dept: ADMINISTRATIVE | Facility: HOSPITAL | Age: 46
End: 2021-04-06

## 2021-07-26 NOTE — PROGRESS NOTES
Refill Authorization Note     is requesting a refill authorization.    Brief assessment and rationale for refill: QUICK DC; Duplicate Request                          Medication Therapy Plan: Duplicate Request, handled in previous encounter; quick DC  Name and strength of medication: clonazePAM (KLONOPIN) 1 MG tablet              Comments:      Former smoker

## 2021-12-06 NOTE — TELEPHONE ENCOUNTER
Please address in Dr. Chakraborty's absence. Thanks     Pt's spouse calling on behalf of pt. Pt needs a letter exempting her from getting Covid vaccine for work. Please advise.

## 2022-05-25 DIAGNOSIS — Z12.31 OTHER SCREENING MAMMOGRAM: ICD-10-CM

## 2022-06-26 NOTE — PLAN OF CARE
Outpatient Physical Therapy Discharge Summary    Date: 02/28/2020      Date of PT eval: 10/09/19  Number of PT visits:13  Date of last visit: 2/28/2020    Assessment:  Unable to assess if goals met secondary to lack of attendance.     Short term goals met: Met  Long term goals met: Majority Met    Plan: Discharge from outpatient physical therapy due to self discharge, completion of goals, maximal rehab potential noted.     Patient in agreement with [facility] and to notify the department if s/s change.     Name band;

## 2022-11-29 NOTE — PATIENT INSTRUCTIONS
[email or web address]      
No abnormal movements

## 2023-03-05 NOTE — TELEPHONE ENCOUNTER
Patient wants to is it going to be a problem, refilling her amitriptyline when it runs because she's had to reschedule her apt twice. Please advise what to tell patient.    05-Mar-2023

## 2024-05-13 NOTE — TELEPHONE ENCOUNTER
Attempted to contact pt. Left message asking patient contact pharmacy or return call to clinic regarding request if needed   Orders received for review and left for  signature.